# Patient Record
Sex: FEMALE | Race: OTHER | NOT HISPANIC OR LATINO
[De-identification: names, ages, dates, MRNs, and addresses within clinical notes are randomized per-mention and may not be internally consistent; named-entity substitution may affect disease eponyms.]

---

## 2022-01-07 PROBLEM — Z00.00 ENCOUNTER FOR PREVENTIVE HEALTH EXAMINATION: Status: ACTIVE | Noted: 2022-01-07

## 2022-01-28 ENCOUNTER — APPOINTMENT (OUTPATIENT)
Dept: PLASTIC SURGERY | Facility: CLINIC | Age: 32
End: 2022-01-28

## 2022-03-11 ENCOUNTER — APPOINTMENT (OUTPATIENT)
Dept: PLASTIC SURGERY | Facility: CLINIC | Age: 32
End: 2022-03-11

## 2022-05-23 ENCOUNTER — APPOINTMENT (OUTPATIENT)
Dept: PLASTIC SURGERY | Facility: CLINIC | Age: 32
End: 2022-05-23

## 2022-05-23 PROCEDURE — XXXXX: CPT | Mod: 1L

## 2022-05-24 ENCOUNTER — TRANSCRIPTION ENCOUNTER (OUTPATIENT)
Age: 32
End: 2022-05-24

## 2022-06-01 NOTE — HISTORY OF PRESENT ILLNESS
[Home] : at home, [unfilled] , at the time of the visit. [Medical Office: ([facility])___] : at the medical office located in  [Verbal consent obtained from patient] : the patient, [unfilled]

## 2022-06-01 NOTE — DISCUSSION/SUMMARY
[FreeTextEntry1] : This king patient began transitioning since 14 years old.\par She has been on hormonal therapy consistently since 18 years old.\par She has been in therapy and was diagnosed with Gender Dysphoria concerned about certain facial features that appear masculine and cause bullying desires revision facial feminization surgery followed by Transgender team.\par The following facial features appear masculine and will need to be modified:\par - Brow\par - Nose (revision)\par - Jawline\par - Neck with tracheal bulge\par - Lips\par - Cheeks\par \par Allergies: None\par \par Medications:\par Estradiol injection, spironolactone\par \par PMHx:\par No significant medical history.\par \par Social Hx: No marijuana use; no tobacco use.\par \par Past Surgical History:\par - Surgery Type: Facial feminization surgery - rhinoplasty\par Location: [place]\par Year: 2010\par Complications? None\par \par - Surgery Type: Breast augmentation\par Location: [place]\par Year: 2020\par Complications? None\par \par - Surgery Type: [facility] \par Location: [place]\par Year: 2020\par Complications? None\par \par Patient denies having silicone, fillers, or botox.\par ROS: General health / Constitutional no fever, no chills, no unusual weight changes, no energy level changes, no night sweats.\par Skin: No color or pigmentation changes, no pruritis, no rashes, no ulcers, \par Hair: No changes in color, texture, distribution, loss \par Nails: No color changes, brittleness, infection \par Head: No headaches, no new jaw pain \par Eyes: Good visual acuity, no color blindness, no corrective lenses, no photophobia, no diplopia, no blurred vision, no infection, pain, no medications, \par Ear: no tinnitus, no discharge, no pain, no medications \par Nose: no epistaxis, no rhinorrhea, no rhinitis, no pain, \par Mouth & Throat: no gingivitis, no gingival bleeding, no ulcers, no voice changes, no changes in oral mucosa or tongue \par Neck: no stiffness, no pain, no lymphadenitis, no thyroid disorders, \par Respiratory: no cough, no dyspnea, no wheezing, no chest pain, cyanosis, no pneumonia, no asthma, \par Cardiovascular: no chest pain, no palpitations, no irregular rhythm, no tachycardia, no bradycardia, no heart failure, no dyspnea on exertion (CHAVEZ), no edema \par Gastrointestinal: no nausea / vomiting, no dysphagia, no reflux / GERD, no abdominal pain, no jaundice \par Musculoskeletal: no pain in muscles, bones, or joints; no fractures, no dislocations, no muscular weakness, no atrophy \par Neurological: no paresis, no paralysis, no paresthesia, no seizures, no dizziness, no syncope, no ataxia, no tremor \par Psychological: no childhood behavioral problems, no irritability, no sleep changes \par Hematological: no anemia, no bruising, no bleeding tendencies, \par \par PHYSICAL EXAM \par General: WDWN, in no distress, A & O x 3 (person, place, time) \par HEENT \par Head: AT/NC (atraumatic, normocephalic), including TMJ, sensory and motor; + Prominent brow and lateral orbital rim type III.\par Eyes: EOMI, PERRLA \par Ears: exterior, nl hearing, \par Nose: + bulbous tip with poor tip support, wide nose,\par intranasal exam showed enlarged turbinates and deviated caudal septum.\par Throat & mouth: gd palate elevation, nl tongue mobility, nl tonsil size; + prominent mandibular angle. Active masseter; + wide chin; +Hypoplastic cheeks; hypoplastic lips.\par Neck: no masses, nl pulses, + Prominent tracheal bulge ("Sean's Apple"); +excess submental fat.\par \par We had a 25 minute meeting with the patient discussing diagnosis and medical management issues and outcomes. \par \par Despite having nose surgery during her initial facial feminization surgery, she continues to have a nose that is asymmetric, bulbous and wide with poor tip support which continues to create a masculine appearance. To alleviate these concerns and to create a feminine appearance that will help alleviate her feelings of dysphoria, we recommend a nose revision, which will promote a more feminine appearance. \par \par We also recommend these new facial procedures in regions that have not been operated on before (not revisions to old procedures):\par 1) tracheal shave, 2) submental fat excision; 3) neck tightening/platysmaplasty, 4) Mandibular angle reduction, 5) chin reduction, 6) Malar fat grafting, 7) Upper and lower lip augmentation, 8) Frontal sinus setback, 9) Supraorbital recontouring, 10) Browlift\par \par Plan: \par Revision facial feminization surgery.\par 21139: Frontal sinus anterior wall setback\par 87925: Orbital reconstruction bilateral\par 03817: Browlift bilateral and hairline lowering\par 30880: Supraorbital bar recontouring bilateral\par 65971: Tarsorrhaphy bilateral\par 21209, Mandibular angle resection bilateral\par 21127: Mandibular reconstruction with autologous tissue bilateral\par 21122: Osseous genioplasty narrowing, shortening\par 50753: Mandibular reconstruction with prosthetics bilateral\par 42185: Rhinoplasty (Revision)\par 89220: Submucous resection of septum (nose)\par 17108: Cartilage grafting for nasal reconstruction, use of cadaveric cartilage for  grafts, columellar strut, tip graft\par 24937: Tracheal shave, Laryngeothyroidoplasty\par 11285: Submental fat excision.\par 62156: platysmaplasty\par 01530: Fat grafting to malar regions bilateral from abdomen first 25 ccs\par 15770 x 2: Upper and lower lip augmentation with temporalis fascia\par \par      21139 (Frontal sinus setback): Previous exposure to testosterone has led this patient to have a male appearing forehead with a more bossed shaped; this is opposed to a female appearing forehead that is more flat. A frontal sinus setback procedure will reshape the anterior wall of the frontal sinus by pushing back the bone and change the contour from ‘convex’ (male-shape) to ‘flat’ (female-shape). This surgical change will create a more flattened feminine brow appearance.\par \par ·       88903 (Browlift): Previous exposure to testosterone has led to the patient having a male ‘M-shaped’ hairline as opposed to a female ‘upside-down U-shaped’ hairline. Her receded hairline also creates a high, broad male appearing forehead. Her low set eyebrows on top of her orbital roof rim give her a cano, male-appearing look. Both of these male traits: a high hairline and low-set brows are causing her intense feelings of dysphoria. She would benefit from bilateral browlift and hairline lowering procedures. Raising her lateral eyebrow with a bilateral browlift will create a more female appearance. She has stressed a strong desire to wear her own natural hair and does not want to always have to wear a wig to cover up her male features.\par \par ·       34933 (bilateral orbital reconstruction): The bone growth that occurred during testosterone exposure in the upper half of each orbital has caused this patient to have male appearing orbital features that contribute to the sense of dysphoria she feels in public. Orbital reconstruction with a reciprocating saw for an “L-shaped” ostectomy, on both sides will help remove the excessive bony protrusion; this will be followed by bone material grafting and resorbable plate fixation. The bilateral orbital reconstruction will help alleviate these orbital and upper facial male features.\par \par ·       13843 (Supraorbital bar contouring): This patient has orbital lateral hooding or overhang of her lateral frontal bone which is typically associated with the male skull and orbits. Supraorbital contouring of this lateral orbital region with a pineapple amanda will correct this male feature that is causing this patient dysphoria. These procedures also allows us to do a success browlift procedure since the overhang of bone can inhibit the upper movement of the brow and the removal of this allows for an effective lift.\par \par ·       33184 (Tarsorrhaphy): Bilateral tarsorrhaphy or surgical closure of both eyelids, after protective lacrilube or perilube ointment is applied, is necessary for the safety of the patient’s globes. With the brow reshaping and browlift procedure the upper eyelid will be pulled up so the globe will be exposed and unprotected during this long, proposed procedure. The tarsorrhaphies, allows both globes to be protected so the complications of corneal abrasions may be prevented.\par \par ·       83252 (mandibular angle resection/reduction): This patient has an angular, more boxy jaw which results in male appearing lower face. She also has increased lower facial width related to her mandibular angle lateral projection. Mandibular angle resection/reduction will create a more feminine triangular jaw. By having a mandibular angle reduction, the lower facial width is narrowed and this will create a “V-shaped”, feminine appearance of the jawline when viewed from the front.\par \par ·       62911 (Reconstruction of lower jaw with autologous tissue): This patient’s wide, “U-shaped” lower jaw accounts for public criticism related to male facial features. We propose reconstruction of the lower jaw with bone graft material layered to form a more feminine shape. The goal of this autologous tissue reconstructive procedure is to achieve a tapered, feminine lower jaw.\par \par ·       73701 (osseous genioplasty): This patient has a wide, large chin that contributes to her feelings of gender dysphoria and being mis-gendered in public.  The patient would benefit from an osseous genioplasty that narrows and shortens the chin. The osseous genioplasty will help create a more feminine and more, slender chin. \par \par ·       03017 (Reconstruction of lower jaw with prosthetic): This patient complained of a male shape to the lower one-third of her face. Reconstruction with a titanium implant used to create a chin point angle and support the bone in healing will help the patient achieve her goal of a more female lower face.\par \par ·       31049 (Revision Rhinoplasty): This patient’s nose has characteristics of a male nose. The male nose is often larger and wider with a more bulbous nasal tip. These male nasal features make her feel masculine which exacerbates her gender dysphoria. A rhinoplasty would be beneficial to feminize her nose by creating a smaller nose and more delicate, softer nasal tip. The lateral dorsal shape will also be feminized by the rhinoplasty.\par \par ·       21151 (Submucous resection of nose): This patient’s nasal septum is shaped like a male septum. The septum is the supportive pole that holds up the structure of the nasal pyramid. In this case the septum will also be used to provide cartilage tissue necessary for nasal grafts. This septoplasty is required to modify the septum to create a straight nose with good functional breathing while taking away the characteristics of a male nose.\par \par ·       11141 (Cartilage grafting for nasal reconstruction): Cartilage grafting is crucial for the performance of the feminizing rhinoplasty. This patient has an ethnic type of nose. With regards to her nose, she wants to keep true to her ethnicity while appearing more feminine. To do that cartilage grafts including, bilateral  grafts, a columellar strut graft, a dorsal onlay graft, and nasal tip graft are all necessary.  The dorsal onlay graft will raise the nasal radix and improve the frontonasal regional shape.\par \par ·       38911 (Submental fat removal): Testosterone exposure will build fibrofatty tissue in the submental region that is not corrected by hormone therapy. This patient has this fibrofatty tissue in the submental region which has created a masculine lateral profile appearance. Direct submental fibrofatty tissue excision is necessary to correct this male feature.\par \par ·       23970 (Platysmaplasty): With prolonged testosterone exposure, the submental region will appear full and ptotic. This patient has a full and ptotic submental and neck region which is associated with a male-appearing neck. The female neck is slender and tight. The platsymaplasty, performed after submental fat excision, will help create a slender and tight, female appearing neck.\par \par ·       15770  x 2 (Upper and lower lip augmentation): Females are shown to have gee lips than males, therefore, an upper and lower lip augmentation will create a more feminine appearance for this patient as she has currently has hypoplastic lips. Lip augmentation is also a procedure that not all patients need but we deem that this procedure is necessary for this patient as it well help alleviate her gender dysphoria. \par \par ·       45783 (Tracheal shave or tracheolaryngoplasty): A prominent “Sean’s Apple” is a feature associated with males. Not all trans-women have a prominent “Sean’s Apple”.. However, this trans-woman has a prominent ‘Sean’s Apple (also known as a large laryngeal prominence). This is seen on lateral head position and when her head is raised to the pamela. It causes her intense feelings of dysphoria and it is a cause for misgendering. Therefore, the patient would benefit from a tracheal shave procedure which eliminates this prominent “Sean’s Apple” associated with male appearance.\par \par ·       06199 (Fat grafting to malar facial regions): This patient had prolonged testosterone exposure resulting in male mid-face features with depressed soft tissues in the cheek region. More fullness in the cheek region may be created with fat grafting from the abdomen or hips to the cheek region creating a more full, feminine appearance. The Kaur fat grafting technique with atraumatic harvest and grafting leads to a 70%+ take of fat grafting to this region and is suggested. This procedure will correct the hypoplastic cheeks.\par \par Needs a 3D CT scan and Virtual Surgical Planning. Patient has both letters from her therapist and hormone provider. Will need PCP clearance.\par \par Patient seen in conjunction with Dr. Skinner.\par \par \par

## 2022-07-01 ENCOUNTER — RESULT REVIEW (OUTPATIENT)
Age: 32
End: 2022-07-01

## 2022-07-01 ENCOUNTER — OUTPATIENT (OUTPATIENT)
Dept: OUTPATIENT SERVICES | Facility: HOSPITAL | Age: 32
LOS: 1 days | End: 2022-07-01

## 2022-07-01 ENCOUNTER — APPOINTMENT (OUTPATIENT)
Dept: CT IMAGING | Facility: CLINIC | Age: 32
End: 2022-07-01

## 2022-07-01 PROCEDURE — 70486 CT MAXILLOFACIAL W/O DYE: CPT | Mod: 26

## 2022-08-29 ENCOUNTER — APPOINTMENT (OUTPATIENT)
Dept: PLASTIC SURGERY | Facility: CLINIC | Age: 32
End: 2022-08-29

## 2022-08-29 PROCEDURE — 99213 OFFICE O/P EST LOW 20 MIN: CPT

## 2022-08-29 PROCEDURE — 99072 ADDL SUPL MATRL&STAF TM PHE: CPT

## 2022-09-02 VITALS
OXYGEN SATURATION: 96 % | RESPIRATION RATE: 16 BRPM | DIASTOLIC BLOOD PRESSURE: 77 MMHG | WEIGHT: 188.05 LBS | SYSTOLIC BLOOD PRESSURE: 123 MMHG | HEIGHT: 72 IN | HEART RATE: 90 BPM | TEMPERATURE: 98 F

## 2022-09-02 RX ORDER — INFLUENZA VIRUS VACCINE 15; 15; 15; 15 UG/.5ML; UG/.5ML; UG/.5ML; UG/.5ML
0.5 SUSPENSION INTRAMUSCULAR ONCE
Refills: 0 | Status: DISCONTINUED | OUTPATIENT
Start: 2022-09-06 | End: 2022-09-09

## 2022-09-02 NOTE — PATIENT PROFILE ADULT - NSPROGENBLOODRESTRICT_GEN_A_NUR
blood borne infection concerns Patient does not want any blood transfusion. Patient was educated on the safety protocols taken for a safe transfusion in an emergency situation./blood borne infection concerns

## 2022-09-02 NOTE — PATIENT PROFILE ADULT - FALL HARM RISK - UNIVERSAL INTERVENTIONS
Bed in lowest position, wheels locked, appropriate side rails in place/Call bell, personal items and telephone in reach/Instruct patient to call for assistance before getting out of bed or chair/Non-slip footwear when patient is out of bed/[place] to call system/Physically safe environment - no spills, clutter or unnecessary equipment/Purposeful Proactive Rounding/Room/bathroom lighting operational, light cord in reach

## 2022-09-03 LAB — SARS-COV-2 N GENE NPH QL NAA+PROBE: NOT DETECTED

## 2022-09-05 ENCOUNTER — TRANSCRIPTION ENCOUNTER (OUTPATIENT)
Age: 32
End: 2022-09-05

## 2022-09-06 ENCOUNTER — APPOINTMENT (OUTPATIENT)
Dept: PLASTIC SURGERY | Facility: HOSPITAL | Age: 32
End: 2022-09-06

## 2022-09-06 ENCOUNTER — INPATIENT (INPATIENT)
Facility: HOSPITAL | Age: 32
LOS: 2 days | Discharge: HOME | DRG: 876 | End: 2022-09-09
Attending: SURGERY | Admitting: SURGERY
Payer: MEDICAID

## 2022-09-06 DIAGNOSIS — Z98.82 BREAST IMPLANT STATUS: Chronic | ICD-10-CM

## 2022-09-06 LAB
ANION GAP SERPL CALC-SCNC: 11 MMOL/L — SIGNIFICANT CHANGE UP (ref 5–17)
ANION GAP SERPL CALC-SCNC: 8 MMOL/L — SIGNIFICANT CHANGE UP (ref 5–17)
APTT BLD: 20.9 SEC — LOW (ref 27.5–35.5)
BASE EXCESS BLDA CALC-SCNC: -1.5 MMOL/L — SIGNIFICANT CHANGE UP (ref -2–3)
BASE EXCESS BLDA CALC-SCNC: 0 MMOL/L — SIGNIFICANT CHANGE UP (ref -2–3)
BUN SERPL-MCNC: 13 MG/DL — SIGNIFICANT CHANGE UP (ref 7–23)
BUN SERPL-MCNC: 9 MG/DL — SIGNIFICANT CHANGE UP (ref 7–23)
CALCIUM SERPL-MCNC: 4.8 MG/DL — CRITICAL LOW (ref 8.4–10.5)
CALCIUM SERPL-MCNC: 8 MG/DL — LOW (ref 8.4–10.5)
CHLORIDE SERPL-SCNC: 101 MMOL/L — SIGNIFICANT CHANGE UP (ref 96–108)
CHLORIDE SERPL-SCNC: 118 MMOL/L — HIGH (ref 96–108)
CO2 BLDA-SCNC: 26 MMOL/L — HIGH (ref 19–24)
CO2 BLDA-SCNC: 27 MMOL/L — HIGH (ref 19–24)
CO2 SERPL-SCNC: 17 MMOL/L — LOW (ref 22–31)
CO2 SERPL-SCNC: 24 MMOL/L — SIGNIFICANT CHANGE UP (ref 22–31)
CREAT SERPL-MCNC: 0.38 MG/DL — LOW (ref 0.5–1.3)
CREAT SERPL-MCNC: 0.63 MG/DL — SIGNIFICANT CHANGE UP (ref 0.5–1.3)
EGFR: 119 ML/MIN/1.73M2 — SIGNIFICANT CHANGE UP
EGFR: 135 ML/MIN/1.73M2 — SIGNIFICANT CHANGE UP
GLUCOSE SERPL-MCNC: 118 MG/DL — HIGH (ref 70–99)
GLUCOSE SERPL-MCNC: 168 MG/DL — HIGH (ref 70–99)
HCO3 BLDA-SCNC: 25 MMOL/L — SIGNIFICANT CHANGE UP (ref 21–28)
HCO3 BLDA-SCNC: 25 MMOL/L — SIGNIFICANT CHANGE UP (ref 21–28)
HCT VFR BLD CALC: 37.8 % — SIGNIFICANT CHANGE UP (ref 34.5–45)
HGB BLD-MCNC: 12.6 G/DL — SIGNIFICANT CHANGE UP (ref 11.5–15.5)
INR BLD: 1.09 — SIGNIFICANT CHANGE UP (ref 0.88–1.16)
MAGNESIUM SERPL-MCNC: 1.5 MG/DL — LOW (ref 1.6–2.6)
MCHC RBC-ENTMCNC: 30.8 PG — SIGNIFICANT CHANGE UP (ref 27–34)
MCHC RBC-ENTMCNC: 33.3 GM/DL — SIGNIFICANT CHANGE UP (ref 32–36)
MCV RBC AUTO: 92.4 FL — SIGNIFICANT CHANGE UP (ref 80–100)
NRBC # BLD: 0 /100 WBCS — SIGNIFICANT CHANGE UP (ref 0–0)
PCO2 BLDA: 43 MMHG — HIGH (ref 32–35)
PCO2 BLDA: 47 MMHG — HIGH (ref 32–35)
PH BLDA: 7.33 — LOW (ref 7.35–7.45)
PH BLDA: 7.38 — SIGNIFICANT CHANGE UP (ref 7.35–7.45)
PHOSPHATE SERPL-MCNC: 4.2 MG/DL — SIGNIFICANT CHANGE UP (ref 2.5–4.5)
PLATELET # BLD AUTO: 341 K/UL — SIGNIFICANT CHANGE UP (ref 150–400)
PO2 BLDA: 133 MMHG — HIGH (ref 83–108)
PO2 BLDA: 68 MMHG — LOW (ref 83–108)
POTASSIUM SERPL-MCNC: 2.6 MMOL/L — CRITICAL LOW (ref 3.5–5.3)
POTASSIUM SERPL-MCNC: 4.3 MMOL/L — SIGNIFICANT CHANGE UP (ref 3.5–5.3)
POTASSIUM SERPL-SCNC: 2.6 MMOL/L — CRITICAL LOW (ref 3.5–5.3)
POTASSIUM SERPL-SCNC: 4.3 MMOL/L — SIGNIFICANT CHANGE UP (ref 3.5–5.3)
PROTHROM AB SERPL-ACNC: 13 SEC — SIGNIFICANT CHANGE UP (ref 10.5–13.4)
RBC # BLD: 4.09 M/UL — SIGNIFICANT CHANGE UP (ref 3.8–5.2)
RBC # FLD: 13.5 % — SIGNIFICANT CHANGE UP (ref 10.3–14.5)
SAO2 % BLDA: 93.8 % — LOW (ref 94–98)
SAO2 % BLDA: 98.9 % — HIGH (ref 94–98)
SODIUM SERPL-SCNC: 136 MMOL/L — SIGNIFICANT CHANGE UP (ref 135–145)
SODIUM SERPL-SCNC: 143 MMOL/L — SIGNIFICANT CHANGE UP (ref 135–145)
WBC # BLD: 19.38 K/UL — HIGH (ref 3.8–10.5)
WBC # FLD AUTO: 19.38 K/UL — HIGH (ref 3.8–10.5)

## 2022-09-06 PROCEDURE — 15773 GRFG AUTOL FAT LIPO 25 CC/<: CPT

## 2022-09-06 PROCEDURE — 15770 DERMA-FAT-FASCIA GRAFT: CPT

## 2022-09-06 PROCEDURE — 30450 REVISION OF NOSE: CPT

## 2022-09-06 PROCEDURE — 99291 CRITICAL CARE FIRST HOUR: CPT | Mod: GC

## 2022-09-06 PROCEDURE — 15825 RHYTDCT NCK PLTYSML TGHTG: CPT

## 2022-09-06 PROCEDURE — 71045 X-RAY EXAM CHEST 1 VIEW: CPT | Mod: 26

## 2022-09-06 PROCEDURE — 71045 X-RAY EXAM CHEST 1 VIEW: CPT | Mod: 26,77,76

## 2022-09-06 PROCEDURE — 21256 RECONSTRUCTION OF ORBIT: CPT

## 2022-09-06 PROCEDURE — 15824 RHYTIDECTOMY FOREHEAD: CPT

## 2022-09-06 PROCEDURE — 67875 CLOSURE OF EYELID BY SUTURE: CPT

## 2022-09-06 PROCEDURE — 21172 RCNST SUPR-LAT ORB RM&LW FHD: CPT

## 2022-09-06 PROCEDURE — 21125 AUGMENTATION MNDBLR PROSTC: CPT

## 2022-09-06 PROCEDURE — 21122 GENIOP SLDG OSTEOT 2/>: CPT

## 2022-09-06 PROCEDURE — 21127 AUGMENTATION MNDBLR B1 GRF: CPT

## 2022-09-06 PROCEDURE — 21139 RDCTJ FOREHEAD CNTRG&SETBACK: CPT

## 2022-09-06 PROCEDURE — 15839 EXC EXCESSIVE SKN OTHER AREA: CPT

## 2022-09-06 PROCEDURE — 21209 REDUCTION OF FACIAL BONES: CPT

## 2022-09-06 PROCEDURE — 30520 REPAIR OF NASAL SEPTUM: CPT

## 2022-09-06 PROCEDURE — 20912 REMOVE CARTILAGE FOR GRAFT: CPT | Mod: 59

## 2022-09-06 PROCEDURE — 31899 UNLISTED PX TRACHEA BRONCHI: CPT

## 2022-09-06 DEVICE — GUIDE CUTTING W/PLAN TRANSFORM: Type: IMPLANTABLE DEVICE | Status: FUNCTIONAL

## 2022-09-06 DEVICE — MESH RESORB RXG 51X51MM 0.6MM: Type: IMPLANTABLE DEVICE | Status: FUNCTIONAL

## 2022-09-06 DEVICE — IMP CUSTOM IPS TI 67MM: Type: IMPLANTABLE DEVICE | Status: FUNCTIONAL

## 2022-09-06 DEVICE — SCREW BONE RESORB 2.1X4MM SONIC PIN: Type: IMPLANTABLE DEVICE | Status: FUNCTIONAL

## 2022-09-06 DEVICE — SCREW EMERG CROSS DRIVE TI 2X9: Type: IMPLANTABLE DEVICE | Status: FUNCTIONAL

## 2022-09-06 DEVICE — GUIDE MARKING CRANIAL: Type: IMPLANTABLE DEVICE | Status: FUNCTIONAL

## 2022-09-06 DEVICE — PIN SONIC RX 2.1X4MM: Type: IMPLANTABLE DEVICE | Status: FUNCTIONAL

## 2022-09-06 DEVICE — SCREW MAXDRIVE MINI 2X7MM: Type: IMPLANTABLE DEVICE | Status: FUNCTIONAL

## 2022-09-06 DEVICE — GUIDE CUTTING TRANSFORM XS: Type: IMPLANTABLE DEVICE | Status: FUNCTIONAL

## 2022-09-06 DEVICE — LUKENS BONE WAX 2.5G: Type: IMPLANTABLE DEVICE | Status: FUNCTIONAL

## 2022-09-06 DEVICE — GUIDE MARKING TRANSFORM XS: Type: IMPLANTABLE DEVICE | Status: FUNCTIONAL

## 2022-09-06 RX ORDER — CEFAZOLIN SODIUM 1 G
2000 VIAL (EA) INJECTION EVERY 8 HOURS
Refills: 0 | Status: DISCONTINUED | OUTPATIENT
Start: 2022-09-06 | End: 2022-09-06

## 2022-09-06 RX ORDER — DEXAMETHASONE 0.5 MG/5ML
10 ELIXIR ORAL EVERY 12 HOURS
Refills: 0 | Status: DISCONTINUED | OUTPATIENT
Start: 2022-09-06 | End: 2022-09-08

## 2022-09-06 RX ORDER — FENTANYL CITRATE 50 UG/ML
0.5 INJECTION INTRAVENOUS
Qty: 2500 | Refills: 0 | Status: DISCONTINUED | OUTPATIENT
Start: 2022-09-06 | End: 2022-09-08

## 2022-09-06 RX ORDER — ACETAMINOPHEN 500 MG
650 TABLET ORAL EVERY 6 HOURS
Refills: 0 | Status: DISCONTINUED | OUTPATIENT
Start: 2022-09-06 | End: 2022-09-07

## 2022-09-06 RX ORDER — CEFTRIAXONE 500 MG/1
2000 INJECTION, POWDER, FOR SOLUTION INTRAMUSCULAR; INTRAVENOUS EVERY 24 HOURS
Refills: 0 | Status: DISCONTINUED | OUTPATIENT
Start: 2022-09-06 | End: 2022-09-07

## 2022-09-06 RX ORDER — OXYCODONE HYDROCHLORIDE 5 MG/1
10 TABLET ORAL EVERY 6 HOURS
Refills: 0 | Status: DISCONTINUED | OUTPATIENT
Start: 2022-09-06 | End: 2022-09-06

## 2022-09-06 RX ORDER — CHLORHEXIDINE GLUCONATE 213 G/1000ML
15 SOLUTION TOPICAL
Refills: 0 | Status: DISCONTINUED | OUTPATIENT
Start: 2022-09-06 | End: 2022-09-09

## 2022-09-06 RX ORDER — FENTANYL CITRATE 50 UG/ML
50 INJECTION INTRAVENOUS ONCE
Refills: 0 | Status: DISCONTINUED | OUTPATIENT
Start: 2022-09-06 | End: 2022-09-06

## 2022-09-06 RX ORDER — OXYCODONE HYDROCHLORIDE 5 MG/1
5 TABLET ORAL EVERY 4 HOURS
Refills: 0 | Status: DISCONTINUED | OUTPATIENT
Start: 2022-09-06 | End: 2022-09-06

## 2022-09-06 RX ORDER — CEFAZOLIN SODIUM 1 G
2000 VIAL (EA) INJECTION EVERY 8 HOURS
Refills: 0 | Status: DISCONTINUED | OUTPATIENT
Start: 2022-09-06 | End: 2022-09-07

## 2022-09-06 RX ORDER — APREPITANT 80 MG/1
40 CAPSULE ORAL ONCE
Refills: 0 | Status: DISCONTINUED | OUTPATIENT
Start: 2022-09-06 | End: 2022-09-06

## 2022-09-06 RX ORDER — PROPOFOL 10 MG/ML
5 INJECTION, EMULSION INTRAVENOUS
Qty: 1000 | Refills: 0 | Status: DISCONTINUED | OUTPATIENT
Start: 2022-09-06 | End: 2022-09-08

## 2022-09-06 RX ORDER — ONDANSETRON 8 MG/1
4 TABLET, FILM COATED ORAL EVERY 6 HOURS
Refills: 0 | Status: DISCONTINUED | OUTPATIENT
Start: 2022-09-06 | End: 2022-09-09

## 2022-09-06 RX ORDER — HYDROMORPHONE HYDROCHLORIDE 2 MG/ML
0.5 INJECTION INTRAMUSCULAR; INTRAVENOUS; SUBCUTANEOUS
Refills: 0 | Status: DISCONTINUED | OUTPATIENT
Start: 2022-09-06 | End: 2022-09-06

## 2022-09-06 RX ORDER — FENTANYL CITRATE 50 UG/ML
0.5 INJECTION INTRAVENOUS
Qty: 2500 | Refills: 0 | Status: DISCONTINUED | OUTPATIENT
Start: 2022-09-06 | End: 2022-09-06

## 2022-09-06 RX ORDER — NOREPINEPHRINE BITARTRATE/D5W 8 MG/250ML
0.05 PLASTIC BAG, INJECTION (ML) INTRAVENOUS
Qty: 8 | Refills: 0 | Status: DISCONTINUED | OUTPATIENT
Start: 2022-09-06 | End: 2022-09-07

## 2022-09-06 RX ORDER — PROPOFOL 10 MG/ML
0.1 INJECTION, EMULSION INTRAVENOUS
Qty: 1000 | Refills: 0 | Status: DISCONTINUED | OUTPATIENT
Start: 2022-09-06 | End: 2022-09-06

## 2022-09-06 RX ORDER — MAGNESIUM SULFATE 500 MG/ML
2 VIAL (ML) INJECTION
Refills: 0 | Status: COMPLETED | OUTPATIENT
Start: 2022-09-06 | End: 2022-09-07

## 2022-09-06 RX ORDER — SPIRONOLACTONE 25 MG/1
0 TABLET, FILM COATED ORAL
Qty: 0 | Refills: 0 | DISCHARGE

## 2022-09-06 RX ORDER — SODIUM CHLORIDE 9 MG/ML
1000 INJECTION, SOLUTION INTRAVENOUS
Refills: 0 | Status: DISCONTINUED | OUTPATIENT
Start: 2022-09-06 | End: 2022-09-07

## 2022-09-06 RX ORDER — CHLORHEXIDINE GLUCONATE 213 G/1000ML
1 SOLUTION TOPICAL
Refills: 0 | Status: DISCONTINUED | OUTPATIENT
Start: 2022-09-06 | End: 2022-09-09

## 2022-09-06 RX ADMIN — SODIUM CHLORIDE 80 MILLILITER(S): 9 INJECTION, SOLUTION INTRAVENOUS at 23:26

## 2022-09-06 RX ADMIN — Medication 25 GRAM(S): at 23:06

## 2022-09-06 RX ADMIN — Medication 102 MILLIGRAM(S): at 19:59

## 2022-09-06 RX ADMIN — PROPOFOL 2.56 MICROGRAM(S)/KG/MIN: 10 INJECTION, EMULSION INTRAVENOUS at 21:21

## 2022-09-06 RX ADMIN — Medication 8 MICROGRAM(S)/KG/MIN: at 21:00

## 2022-09-06 RX ADMIN — FENTANYL CITRATE 50 MICROGRAM(S): 50 INJECTION INTRAVENOUS at 20:40

## 2022-09-06 RX ADMIN — FENTANYL CITRATE 4.27 MICROGRAM(S)/KG/HR: 50 INJECTION INTRAVENOUS at 21:21

## 2022-09-06 RX ADMIN — FENTANYL CITRATE 50 MICROGRAM(S): 50 INJECTION INTRAVENOUS at 20:15

## 2022-09-06 RX ADMIN — FENTANYL CITRATE 50 MICROGRAM(S): 50 INJECTION INTRAVENOUS at 21:10

## 2022-09-06 RX ADMIN — CEFTRIAXONE 100 MILLIGRAM(S): 500 INJECTION, POWDER, FOR SOLUTION INTRAMUSCULAR; INTRAVENOUS at 21:22

## 2022-09-06 NOTE — BRIEF OPERATIVE NOTE - NSICDXBRIEFPROCEDURE_GEN_ALL_CORE_FT
PROCEDURES:  Contouring, forehead, anterior frontal sinus wall, for reduction and setback 06-Sep-2022 17:06:40  Yissel Fields  Genioplasty 06-Sep-2022 17:07:19  Yissel Fields  Complete rhinoplasty 06-Sep-2022 17:07:27  Yissel Fields  Platysmaplasty 06-Sep-2022 17:07:40  Yissel Fields  Excision, submental fat pad excessive skin and subcutaneous tissue 06-Sep-2022 17:07:48  Yissel Fields  Fat grafting 06-Sep-2022 17:09:35  Yissel Fields

## 2022-09-06 NOTE — BRIEF OPERATIVE NOTE - OPERATION/FINDINGS
Liposuction to abdomen with fat grafting to face, submental fat excision and scar revision. Frontal sinus set back and supraorbital rim contour, genioplasty, bilateral mandible angle reduction, submental fat excision and platysmaplasty

## 2022-09-06 NOTE — CONSULT NOTE ADULT - NS ATTEND AMEND GEN_ALL_CORE FT
S/P feminization surgery after extubation had some nasal bleeding with suctioning required followed by hypoxemia and tachycardia. Now improving and down from 100% to 60% oxygen  physical as above except left mid lung rhonchi with focal B lines on both sides and some air bronchograms  discussed with anesthesia and plastics; possible components of blood aspiration and negative pressure pulmonary edema. Doubt PE with her improving so fast and the history favors the above impression  will give HFNC and titrate down from 60% as tolerated; pulse down to 108 from 140  If has fever will give ceftriaxone  admit to SICU for monitoring

## 2022-09-06 NOTE — CHART NOTE - NSCHARTNOTEFT_GEN_A_CORE
Patient became more hypoxemic and we could not make her comfortable with HFNC or mask as her nose felt too much pressure and she kept taking off the oxygen adjuncts.  /70, RR 24.  Discussed with patient and Dr. Carcamo of anesthesia and we agreed that safest course would be reintubation which was accomplished with some decrease in BP requiring NE. At this point to admit to SICU; PEEP currently 11 on 100% with 96% saturation. Will add ceftriaxone empirically.  Critical care time rendered 70 minutes Will address in separate encounter.

## 2022-09-06 NOTE — CONSULT NOTE ADULT - ASSESSMENT
32F undergoing male to female transformation, hx facial feminization, b/l breast implants, admitted for elective facial feminization surgery, extubated post-operatively, but had copious bloody secretions from nose and mouth, then became hypoxic while she was supine, suspect aspiration pneumonitis. transferred to SICU for hypoxic respiratory failure likely secondary to aspiration pneumonitis.     NEURO: tylenol PRN pain.   CV: hemodynamically stable.   PULM: high flow via face mask at 60 Liters and 37%, (pt declines nasal canula), POCUS with few B lines, but not significant, b/l consolidation (L>R),   GI/FEN: keep NPO for now given tenuous respiratory status, LR@80  PLASTICS: cont usual regimen of decadron 10q12h until dc home (usually till POD#2) per Plastics   : TOV  ENDO: ISS  ID: andrew-op ppx: ancef x 3 days (9/6--)   PPX: SCDs, NO SQH.    LINES: PIVs, trisha (9/6--)   WOUNDS/DRAINS: intranasal and intraoral wounds. drain under scalp.

## 2022-09-06 NOTE — CONSULT NOTE ADULT - SUBJECTIVE AND OBJECTIVE BOX
HPI: 32yFemale     PMH:     MEDS:  Allergies    No Known Allergies    Intolerances        ICU Vital Signs Last 24 Hrs  T(F): 97.3 (09-06-22 @ 16:33), Max: 97.3 (09-06-22 @ 16:33)  HR: 119 (09-06-22 @ 17:29) (96 - 131)  BP: 155/82 (09-06-22 @ 17:29) (113/68 - 157/76)  BP(mean): 100 (09-06-22 @ 17:29) (82 - 136)  ABP: 128/77 (09-06-22 @ 17:29)  RR: 34 (09-06-22 @ 17:29) (14 - 34)  SpO2: 91% (09-06-22 @ 17:29) (85% - 98%)    PHYSICAL EXAM:   Neurological: AAOx3, CNII-XII intact,  strength 5/5 b/l  ENT: mucus membrane moist  Cardiovascular: RRR  Respiratory: CTA  Gastrointestinal: soft, NT, ND, BS+  Extremities: warm, no dependent edema  Vascular: no cyanosis/erythema  Skin: no rashes  MSK: no joint swelling.   LABS:                              12.6   19.38 )-----------( 341      ( 06 Sep 2022 17:52 )             37.8     ABG - ( 06 Sep 2022 17:31 )  pH, Arterial: 7.33  pH, Blood: x     /  pCO2: 47    /  pO2: 133   / HCO3: 25    / Base Excess: -1.5  /  SaO2: 98.9            CAPILLARY BLOOD GLUCOSE        Lindsey:	  [ ] None	[ ] Daily Lindsey Order Placed	   Indication:	  [ ] Strict I and O's    [ ] Obstruction     [ ] Incontinence + Stage 3 or 4 Decubitus  Central Line:  [ ] None	   [ ]  Medication / TPN Administration     [ ] No Peripheral IV          HPI: 32yFemale (undergoing male to female transformation), hx facial feminization surgery, b/l breast implants, admitted for elective facial feminization, extubated post-operatively, but had copious bloody secretions from nose and mouth, then became hypoxic while she was supine, suspect aspiration.     SURGERY:  9/6: (facial feminization) Liposuction to abdomen with fat grafting to face, submental fat excision and scar revision. Frontal sinus set back and supraorbital rim contour, genioplasty, bilateral mandible angle reduction, submental fat excision and platysmaplasty    MEDS:  NKDA      ICU Vital Signs Last 24 Hrs  T(F): 97.3 (09-06-22 @ 16:33), Max: 97.3 (09-06-22 @ 16:33)  HR: 119 (09-06-22 @ 17:29) (96 - 131)  BP: 155/82 (09-06-22 @ 17:29) (113/68 - 157/76)  BP(mean): 100 (09-06-22 @ 17:29) (82 - 136)  ABP: 128/77 (09-06-22 @ 17:29)  RR: 34 (09-06-22 @ 17:29) (14 - 34)  SpO2: 91% (09-06-22 @ 17:29) (85% - 98%)    PHYSICAL EXAM:   Neurological: AAOx3, CNII-XII intact,  strength 5/5 b/l  ENT: incisions intranasally and intraorally, dried blood to nose and mouth, but no active bleeding.   Cardiovascular: RRR  Respiratory: few expiratory wheezes,   Gastrointestinal: soft, NT, ND, BS+  Extremities: warm, no dependent edema  Vascular: no cyanosis/erythema  Skin: no rashes  MSK: no joint swelling.     LABS:                      12.6   19.38 )-----------( 341      ( 06 Sep 2022 17:52 )             37.8     ABG - ( 06 Sep 2022 17:31 )  pH, Arterial: 7.33  pH, Blood: x     /  pCO2: 47    /  pO2: 133   / HCO3: 25    / Base Excess: -1.5  /  SaO2: 98.9            CAPILLARY BLOOD GLUCOSE        Lindsey:	  [x ] None	[ ] Daily Lindsey Order Placed	   Indication:	  [ ] Strict I and O's    [ ] Obstruction     [ ] Incontinence + Stage 3 or 4 Decubitus  Central Line:  [x ] None	   [ ]  Medication / TPN Administration     [ ] No Peripheral IV

## 2022-09-07 LAB
ALBUMIN SERPL ELPH-MCNC: 3.2 G/DL — LOW (ref 3.3–5)
ANION GAP SERPL CALC-SCNC: 10 MMOL/L — SIGNIFICANT CHANGE UP (ref 5–17)
ANION GAP SERPL CALC-SCNC: 8 MMOL/L — SIGNIFICANT CHANGE UP (ref 5–17)
BUN SERPL-MCNC: 11 MG/DL — SIGNIFICANT CHANGE UP (ref 7–23)
BUN SERPL-MCNC: 8 MG/DL — SIGNIFICANT CHANGE UP (ref 7–23)
CALCIUM SERPL-MCNC: 7.6 MG/DL — LOW (ref 8.4–10.5)
CALCIUM SERPL-MCNC: 7.6 MG/DL — LOW (ref 8.4–10.5)
CHLORIDE SERPL-SCNC: 101 MMOL/L — SIGNIFICANT CHANGE UP (ref 96–108)
CHLORIDE SERPL-SCNC: 102 MMOL/L — SIGNIFICANT CHANGE UP (ref 96–108)
CO2 SERPL-SCNC: 26 MMOL/L — SIGNIFICANT CHANGE UP (ref 22–31)
CO2 SERPL-SCNC: 28 MMOL/L — SIGNIFICANT CHANGE UP (ref 22–31)
CREAT SERPL-MCNC: 0.63 MG/DL — SIGNIFICANT CHANGE UP (ref 0.5–1.3)
CREAT SERPL-MCNC: 0.64 MG/DL — SIGNIFICANT CHANGE UP (ref 0.5–1.3)
EGFR: 119 ML/MIN/1.73M2 — SIGNIFICANT CHANGE UP
EGFR: 119 ML/MIN/1.73M2 — SIGNIFICANT CHANGE UP
GLUCOSE BLDC GLUCOMTR-MCNC: 135 MG/DL — HIGH (ref 70–99)
GLUCOSE SERPL-MCNC: 139 MG/DL — HIGH (ref 70–99)
GLUCOSE SERPL-MCNC: 144 MG/DL — HIGH (ref 70–99)
GRAM STN FLD: SIGNIFICANT CHANGE UP
HCT VFR BLD CALC: 31.4 % — LOW (ref 34.5–45)
HCT VFR BLD CALC: 33.5 % — LOW (ref 34.5–45)
HGB BLD-MCNC: 10.6 G/DL — LOW (ref 11.5–15.5)
HGB BLD-MCNC: 11.1 G/DL — LOW (ref 11.5–15.5)
MAGNESIUM SERPL-MCNC: 2.2 MG/DL — SIGNIFICANT CHANGE UP (ref 1.6–2.6)
MAGNESIUM SERPL-MCNC: 3.2 MG/DL — HIGH (ref 1.6–2.6)
MCHC RBC-ENTMCNC: 30.6 PG — SIGNIFICANT CHANGE UP (ref 27–34)
MCHC RBC-ENTMCNC: 31.2 PG — SIGNIFICANT CHANGE UP (ref 27–34)
MCHC RBC-ENTMCNC: 33.1 GM/DL — SIGNIFICANT CHANGE UP (ref 32–36)
MCHC RBC-ENTMCNC: 33.8 GM/DL — SIGNIFICANT CHANGE UP (ref 32–36)
MCV RBC AUTO: 92.3 FL — SIGNIFICANT CHANGE UP (ref 80–100)
MCV RBC AUTO: 92.4 FL — SIGNIFICANT CHANGE UP (ref 80–100)
NRBC # BLD: 0 /100 WBCS — SIGNIFICANT CHANGE UP (ref 0–0)
NRBC # BLD: 0 /100 WBCS — SIGNIFICANT CHANGE UP (ref 0–0)
PHOSPHATE SERPL-MCNC: 3.1 MG/DL — SIGNIFICANT CHANGE UP (ref 2.5–4.5)
PHOSPHATE SERPL-MCNC: 3.2 MG/DL — SIGNIFICANT CHANGE UP (ref 2.5–4.5)
PLATELET # BLD AUTO: 277 K/UL — SIGNIFICANT CHANGE UP (ref 150–400)
PLATELET # BLD AUTO: 293 K/UL — SIGNIFICANT CHANGE UP (ref 150–400)
POTASSIUM SERPL-MCNC: 4.2 MMOL/L — SIGNIFICANT CHANGE UP (ref 3.5–5.3)
POTASSIUM SERPL-MCNC: 4.2 MMOL/L — SIGNIFICANT CHANGE UP (ref 3.5–5.3)
POTASSIUM SERPL-SCNC: 4.2 MMOL/L — SIGNIFICANT CHANGE UP (ref 3.5–5.3)
POTASSIUM SERPL-SCNC: 4.2 MMOL/L — SIGNIFICANT CHANGE UP (ref 3.5–5.3)
RBC # BLD: 3.4 M/UL — LOW (ref 3.8–5.2)
RBC # BLD: 3.63 M/UL — LOW (ref 3.8–5.2)
RBC # FLD: 13.6 % — SIGNIFICANT CHANGE UP (ref 10.3–14.5)
RBC # FLD: 13.6 % — SIGNIFICANT CHANGE UP (ref 10.3–14.5)
SODIUM SERPL-SCNC: 137 MMOL/L — SIGNIFICANT CHANGE UP (ref 135–145)
SODIUM SERPL-SCNC: 138 MMOL/L — SIGNIFICANT CHANGE UP (ref 135–145)
SPECIMEN SOURCE: SIGNIFICANT CHANGE UP
WBC # BLD: 18.35 K/UL — HIGH (ref 3.8–10.5)
WBC # BLD: 18.68 K/UL — HIGH (ref 3.8–10.5)
WBC # FLD AUTO: 18.35 K/UL — HIGH (ref 3.8–10.5)
WBC # FLD AUTO: 18.68 K/UL — HIGH (ref 3.8–10.5)

## 2022-09-07 PROCEDURE — 71045 X-RAY EXAM CHEST 1 VIEW: CPT | Mod: 26

## 2022-09-07 PROCEDURE — 71045 X-RAY EXAM CHEST 1 VIEW: CPT | Mod: 26,77

## 2022-09-07 PROCEDURE — 99291 CRITICAL CARE FIRST HOUR: CPT | Mod: GC

## 2022-09-07 RX ORDER — ACETAMINOPHEN 500 MG
1000 TABLET ORAL ONCE
Refills: 0 | Status: COMPLETED | OUTPATIENT
Start: 2022-09-07 | End: 2022-09-07

## 2022-09-07 RX ORDER — MIDAZOLAM HYDROCHLORIDE 1 MG/ML
4 INJECTION, SOLUTION INTRAMUSCULAR; INTRAVENOUS ONCE
Refills: 0 | Status: DISCONTINUED | OUTPATIENT
Start: 2022-09-07 | End: 2022-09-07

## 2022-09-07 RX ORDER — CEFAZOLIN SODIUM 1 G
2000 VIAL (EA) INJECTION EVERY 8 HOURS
Refills: 0 | Status: DISCONTINUED | OUTPATIENT
Start: 2022-09-08 | End: 2022-09-09

## 2022-09-07 RX ORDER — DEXMEDETOMIDINE HYDROCHLORIDE IN 0.9% SODIUM CHLORIDE 4 UG/ML
0.5 INJECTION INTRAVENOUS
Qty: 400 | Refills: 0 | Status: DISCONTINUED | OUTPATIENT
Start: 2022-09-07 | End: 2022-09-08

## 2022-09-07 RX ORDER — CALCIUM GLUCONATE 100 MG/ML
2 VIAL (ML) INTRAVENOUS ONCE
Refills: 0 | Status: DISCONTINUED | OUTPATIENT
Start: 2022-09-07 | End: 2022-09-07

## 2022-09-07 RX ORDER — PANTOPRAZOLE SODIUM 20 MG/1
40 TABLET, DELAYED RELEASE ORAL DAILY
Refills: 0 | Status: DISCONTINUED | OUTPATIENT
Start: 2022-09-07 | End: 2022-09-09

## 2022-09-07 RX ORDER — HEPARIN SODIUM 5000 [USP'U]/ML
5000 INJECTION INTRAVENOUS; SUBCUTANEOUS EVERY 8 HOURS
Refills: 0 | Status: DISCONTINUED | OUTPATIENT
Start: 2022-09-07 | End: 2022-09-09

## 2022-09-07 RX ORDER — ACETAMINOPHEN 500 MG
1000 TABLET ORAL ONCE
Refills: 0 | Status: COMPLETED | OUTPATIENT
Start: 2022-09-07 | End: 2022-09-08

## 2022-09-07 RX ORDER — NOREPINEPHRINE BITARTRATE/D5W 8 MG/250ML
0.05 PLASTIC BAG, INJECTION (ML) INTRAVENOUS
Qty: 8 | Refills: 0 | Status: DISCONTINUED | OUTPATIENT
Start: 2022-09-07 | End: 2022-09-08

## 2022-09-07 RX ADMIN — DEXMEDETOMIDINE HYDROCHLORIDE IN 0.9% SODIUM CHLORIDE 11.5 MICROGRAM(S)/KG/HR: 4 INJECTION INTRAVENOUS at 14:02

## 2022-09-07 RX ADMIN — PROPOFOL 2.56 MICROGRAM(S)/KG/MIN: 10 INJECTION, EMULSION INTRAVENOUS at 10:27

## 2022-09-07 RX ADMIN — Medication 25 GRAM(S): at 02:04

## 2022-09-07 RX ADMIN — CHLORHEXIDINE GLUCONATE 15 MILLILITER(S): 213 SOLUTION TOPICAL at 05:21

## 2022-09-07 RX ADMIN — Medication 100 MILLIGRAM(S): at 17:17

## 2022-09-07 RX ADMIN — PROPOFOL 2.56 MICROGRAM(S)/KG/MIN: 10 INJECTION, EMULSION INTRAVENOUS at 23:03

## 2022-09-07 RX ADMIN — PROPOFOL 2.56 MICROGRAM(S)/KG/MIN: 10 INJECTION, EMULSION INTRAVENOUS at 02:41

## 2022-09-07 RX ADMIN — PROPOFOL 2.56 MICROGRAM(S)/KG/MIN: 10 INJECTION, EMULSION INTRAVENOUS at 01:15

## 2022-09-07 RX ADMIN — Medication 1000 MILLIGRAM(S): at 19:44

## 2022-09-07 RX ADMIN — MIDAZOLAM HYDROCHLORIDE 4 MILLIGRAM(S): 1 INJECTION, SOLUTION INTRAMUSCULAR; INTRAVENOUS at 09:22

## 2022-09-07 RX ADMIN — DEXMEDETOMIDINE HYDROCHLORIDE IN 0.9% SODIUM CHLORIDE 11.5 MICROGRAM(S)/KG/HR: 4 INJECTION INTRAVENOUS at 10:20

## 2022-09-07 RX ADMIN — Medication 102 MILLIGRAM(S): at 07:21

## 2022-09-07 RX ADMIN — CHLORHEXIDINE GLUCONATE 15 MILLILITER(S): 213 SOLUTION TOPICAL at 17:17

## 2022-09-07 RX ADMIN — Medication 400 MILLIGRAM(S): at 18:45

## 2022-09-07 RX ADMIN — Medication 102 MILLIGRAM(S): at 17:18

## 2022-09-07 RX ADMIN — DEXMEDETOMIDINE HYDROCHLORIDE IN 0.9% SODIUM CHLORIDE 11.5 MICROGRAM(S)/KG/HR: 4 INJECTION INTRAVENOUS at 19:14

## 2022-09-07 RX ADMIN — FENTANYL CITRATE 4.27 MICROGRAM(S)/KG/HR: 50 INJECTION INTRAVENOUS at 16:33

## 2022-09-07 RX ADMIN — HEPARIN SODIUM 5000 UNIT(S): 5000 INJECTION INTRAVENOUS; SUBCUTANEOUS at 22:20

## 2022-09-07 RX ADMIN — Medication 100 MILLIGRAM(S): at 01:16

## 2022-09-07 RX ADMIN — CHLORHEXIDINE GLUCONATE 1 APPLICATION(S): 213 SOLUTION TOPICAL at 05:21

## 2022-09-07 RX ADMIN — Medication 8.63 MICROGRAM(S)/KG/MIN: at 10:20

## 2022-09-07 RX ADMIN — Medication 100 MILLIGRAM(S): at 10:20

## 2022-09-07 RX ADMIN — PANTOPRAZOLE SODIUM 40 MILLIGRAM(S): 20 TABLET, DELAYED RELEASE ORAL at 11:35

## 2022-09-07 RX ADMIN — PROPOFOL 2.56 MICROGRAM(S)/KG/MIN: 10 INJECTION, EMULSION INTRAVENOUS at 19:14

## 2022-09-07 NOTE — PROGRESS NOTE ADULT - SUBJECTIVE AND OBJECTIVE BOX
LOBO GHOSH  [id number]    Subjective:    Patient seen and examined, remains in ICU intubated. 40% FiO2 - stable.     Objective:  T(C): 37 (09-07-22 @ 07:01), Max: 37.8 (09-06-22 @ 22:01)  HR: 92 (09-07-22 @ 07:00) (90 - 131)  BP: 119/59 (09-07-22 @ 07:00) (110/55 - 157/76)  RR: 15 (09-07-22 @ 07:00) (14 - 42)  SpO2: 98% (09-07-22 @ 07:00) (68% - 100%)  Wt(kg): --   09-07    137  |  101  |  8   ----------------------------<  139<H>  4.2   |  28  |  0.64    Ca    7.6<L>      07 Sep 2022 05:34  Phos  3.2     09-07  Mg     3.2     09-07                          10.6   18.68 )-----------( 293      ( 07 Sep 2022 05:34 )             31.4       09-06 @ 07:01  -  09-07 @ 07:00  --------------------------------------------------------  IN: 1422.2 mL / OUT: 1575 mL / NET: -152.8 mL    09-07 @ 07:01  -  09-07 @ 07:33  --------------------------------------------------------  IN: 166.8 mL / OUT: 0 mL / NET: 166.8 mL      PHYSICAL EXAM:    >> General: NAD   >> HEENT: Generalized swelling and ecchymosis, dressings CDI, JOLEEN serosang.    >> Cardiovascular: RRR  >> Lungs: Sedated and intubated, FiO2 40%. ET in position.   >> Abdomen: Soft. Dressing CDI.   >> Extremities: Unremarkable.             MEDICATIONS  (STANDING):  ceFAZolin   IVPB 2000 milliGRAM(s) IV Intermittent every 8 hours  cefTRIAXone   IVPB 2000 milliGRAM(s) IV Intermittent every 24 hours  chlorhexidine 0.12% Liquid 15 milliLiter(s) Oral Mucosa two times a day  chlorhexidine 2% Cloths 1 Application(s) Topical <User Schedule>  dexAMETHasone  IVPB 10 milliGRAM(s) IV Intermittent every 12 hours  fentaNYL   Infusion. 0.5 MICROgram(s)/kG/Hr (4.27 mL/Hr) IV Continuous <Continuous>  influenza   Vaccine 0.5 milliLiter(s) IntraMuscular once  lactated ringers. 1000 milliLiter(s) (80 mL/Hr) IV Continuous <Continuous>  norepinephrine Infusion 0.05 MICROgram(s)/kG/Min (8 mL/Hr) IV Continuous <Continuous>  propofol Infusion 5 MICROgram(s)/kG/Min (2.56 mL/Hr) IV Continuous <Continuous>    MEDICATIONS  (PRN):  acetaminophen   IVPB .. 1000 milliGRAM(s) IV Intermittent once PRN Mild Pain (1 - 3)  ondansetron Injectable 4 milliGRAM(s) IV Push every 6 hours PRN Nausea and/or Vomiting

## 2022-09-07 NOTE — PROGRESS NOTE ADULT - ATTENDING COMMENTS
acute hypoxemic respiratory failure with aspiration vs negative pressure pulmonary edema  physical as above  no air leak on cuff deflation; will continue with decadron and reassess for extubation tomorrow  the shunt has improved markedly, now on 40% oxygen with PEEP 5  continue ancef; doubt pneumonia  RASS -3 to -4 with precedex/fentanyl/propofol  requiring some NE from sedative effects for MAP>65

## 2022-09-07 NOTE — PROGRESS NOTE ADULT - ASSESSMENT
32F undergoing male to female transformation, hx facial feminization, b/l breast implants, admitted for elective facial feminization surgery, extubated post-operatively, but had copious bloody secretions from nose and mouth, then became hypoxic while she was supine, suspect aspiration pneumonitis. transferred to SICU for hypoxic respiratory failure likely secondary to aspiration pneumonitis with subsequent ARDS.    NEURO: tylenol PRN pain. Sedated (precedex)  CV: hemodynamically stable. Midodrine added, low BP (9/7). Fluids stopped  PULM: Intubated AC (pt declines nasal canula), POCUS with few B lines, but not significant, b/l consolidation (L>R). Failed leak test (9/7), reassess in afternoon. + bilateral pleural opacities, ARDS post aspiration pneumonitis  GI/FEN: keep NPO for now given tenuous respiratory status,   PLASTICS: cont usual regimen of decadron 10q12h until dc home (usually till POD#2) per Plastics   : TOV, uretheral catheter  ENDO: ISS  ID: andrew-op ppx: ancef x 3 days (9/6--), pending cxs   PPX: SCDs, NO SQH.    LINES: x3 PIVs, trisha (9/6--)   WOUNDS/DRAINS: intranasal and intraoral wounds. drain under scalp (+JPx1)

## 2022-09-07 NOTE — PROGRESS NOTE ADULT - SUBJECTIVE AND OBJECTIVE BOX
O/N: Patient uncomfortable and refusing to use hi flow NC with hypoxic events. decision to intubate was made in PM + pressors.    HPI: 32yFemale (undergoing male to female transformation), hx facial feminization surgery, b/l breast implants, admitted for elective facial feminization, extubated post-operatively, but had copious bloody secretions from nose and mouth, then became hypoxic while she was supine, suspect aspiration pneumonitis with subsequent ARDS.     SURGERY:  9/6: (facial feminization) Liposuction to abdomen with fat grafting to face, submental fat excision and scar revision. Frontal sinus set back and supraorbital rim contour, genioplasty, bilateral mandible angle reduction, submental fat excision and platysmaplasty      PHYSICAL EXAM:     General: Well appearing resting comfortably in bed in no acute distress, intubated & sedated  Neuro: sedated, unable to assess.   HEENT: Normocephalic, atraumatic, trachea midline, no JVD. incisions intranasally and intraorally, dried blood to nose and mouth, but no active bleeding.   Heart: Regular S1/S2, no murmurs rubs or gallops   Lungs: Unlabored breathing; Decreased breath sounds bilaterally L>R, no wheezing, rales, rhonchi, or stridor noted.   Abdomen: Soft, non-distended, normoactive bowel sounds throughout, no tenderness to palpation in all 4 quadrants   Upper Extremities: No edema, freely mobile bilaterally   Lower Extremities: No edema, SCDs in place, feet warm bilaterally   Skin: Warm, non-diaphoretic throughout   : uretheral catheter well positioned  Lines/tubes: x3 IV lines, 1 PICC line (9/6), intubated with soft suctioning, x1 scalp JOLEEN            PAST MEDICAL & SURGICAL HISTORY:  Gender identity disorder, unspecified      H/O bilateral breast implants        Allergies    No Known Allergies    Intolerances      MEDICATIONS  (STANDING):  ceFAZolin   IVPB 2000 milliGRAM(s) IV Intermittent every 8 hours  chlorhexidine 0.12% Liquid 15 milliLiter(s) Oral Mucosa two times a day  chlorhexidine 2% Cloths 1 Application(s) Topical <User Schedule>  dexAMETHasone  IVPB 10 milliGRAM(s) IV Intermittent every 12 hours  dexMEDEtomidine Infusion 0.5 MICROgram(s)/kG/Hr (11.5 mL/Hr) IV Continuous <Continuous>  fentaNYL   Infusion. 0.5 MICROgram(s)/kG/Hr (4.27 mL/Hr) IV Continuous <Continuous>  influenza   Vaccine 0.5 milliLiter(s) IntraMuscular once  norepinephrine Infusion 0.05 MICROgram(s)/kG/Min (8.63 mL/Hr) IV Continuous <Continuous>  pantoprazole  Injectable 40 milliGRAM(s) IV Push daily  propofol Infusion 5 MICROgram(s)/kG/Min (2.56 mL/Hr) IV Continuous <Continuous>    MEDICATIONS  (PRN):  acetaminophen   IVPB .. 1000 milliGRAM(s) IV Intermittent once PRN Mild Pain (1 - 3)  ondansetron Injectable 4 milliGRAM(s) IV Push every 6 hours PRN Nausea and/or Vomiting      Labs:                         10.6   18.68 )-----------( 293      ( 07 Sep 2022 05:34 )             31.4     09-07    137  |  101  |  8   ----------------------------<  139<H>  4.2   |  28  |  0.64    Ca    7.6<L>      07 Sep 2022 05:34  Phos  3.2     09-07  Mg     3.2     09-07    TPro  x   /  Alb  3.2<L>  /  TBili  x   /  DBili  x   /  AST  x   /  ALT  x   /  AlkPhos  x   09-07    CAPILLARY BLOOD GLUCOSE            PT/INR - ( 06 Sep 2022 17:52 )   PT: 13.0 sec;   INR: 1.09          PTT - ( 06 Sep 2022 17:52 )  PTT:20.9 sec          Vital Signs:   Vital Signs Last 24 Hrs  T(C): 37.1 (07 Sep 2022 10:30), Max: 37.8 (06 Sep 2022 22:01)  T(F): 98.8 (07 Sep 2022 10:30), Max: 100.1 (06 Sep 2022 22:01)  HR: 80 (07 Sep 2022 11:00) (80 - 131)  BP: 98/57 (07 Sep 2022 11:00) (84/49 - 157/76)  BP(mean): 73 (07 Sep 2022 11:00) (62 - 136)  RR: 15 (07 Sep 2022 07:00) (14 - 42)  SpO2: 99% (07 Sep 2022 11:00) (68% - 100%)    Parameters below as of 07 Sep 2022 11:00  Patient On (Oxygen Delivery Method): ventilator    O2 Concentration (%): 40  Mode: AC/ CMV (Assist Control/ Continuous Mandatory Ventilation), RR (machine): 15, TV (machine): 480, FiO2: 40, PEEP: 5, ITime: 1, MAP: 10, PIP: 18    Input/Output:   I&O's Detail    06 Sep 2022 07:01  -  07 Sep 2022 07:00  --------------------------------------------------------  IN:    FentaNYL: 96.6 mL    IV PiggyBack: 50 mL    IV PiggyBack: 50 mL    IV PiggyBack: 100 mL    Lactated Ringers: 720 mL    Norepinephrine: 47.6 mL    Propofol: 362.6 mL  Total IN: 1426.8 mL    OUT:    Bulb (mL): 10 mL    Indwelling Catheter - Urethral (mL): 1565 mL    Nasogastric/Oral tube (mL): 0 mL  Total OUT: 1575 mL    Total NET: -148.2 mL      07 Sep 2022 07:01  -  07 Sep 2022 11:43  --------------------------------------------------------  IN:    Dexmedetomidine: 52.9 mL    FentaNYL: 55.2 mL    IV PiggyBack: 100 mL    IV PiggyBack: 50 mL    Lactated Ringers: 80 mL    Norepinephrine: 12.1 mL    Propofol: 104.9 mL  Total IN: 455.1 mL    OUT:    Bulb (mL): 0 mL    Indwelling Catheter - Urethral (mL): 275 mL    Norepinephrine: 0 mL  Total OUT: 275 mL    Total NET: 180.1 mL        Daily     Daily       Weight (kg): 92 (09-06-22 @ 21:00)    < from: Xray Chest 1 View-PORTABLE IMMEDIATE (Xray Chest 1 View-PORTABLE IMMEDIATE .) (09.07.22 @ 09:25) >  < from: Xray Chest 1 View-PORTABLE IMMEDIATE (Xray Chest 1 View-PORTABLE IMMEDIATE .) (09.07.22 @ 09:25) >  IMPRESSION:    The endotracheal tube has been minimally advanced since prior exam   earlier same day. The tip of the endotracheal tube is at the inferior   margin of the clavicles.    No other change. Nasogastric tube tip in stomach. Lung infiltrates. No   pneumothorax.    < end of copied text >  < from: Xray Chest 1 View AP/PA (09.06.22 @ 16:29) >    INTERPRETATION:  TECHNIQUE: Single portable view of the chest.    COMPARISON:  None    CLINICAL HISTORY: Desaturation    FINDINGS:    Single frontal view of the chest demonstrates large bilateral perihilar   infiltrates. The cardiomediastinal silhouette is normal. No acute osseous   abnormalities. Overlying EKG leads and wires are noted    IMPRESSION: Large bilateral perihilar infiltrates.    < end of copied text >

## 2022-09-08 LAB
ANION GAP SERPL CALC-SCNC: 9 MMOL/L — SIGNIFICANT CHANGE UP (ref 5–17)
BASE EXCESS BLDA CALC-SCNC: 4.3 MMOL/L — HIGH (ref -2–3)
BUN SERPL-MCNC: 10 MG/DL — SIGNIFICANT CHANGE UP (ref 7–23)
CALCIUM SERPL-MCNC: 7.5 MG/DL — LOW (ref 8.4–10.5)
CHLORIDE SERPL-SCNC: 99 MMOL/L — SIGNIFICANT CHANGE UP (ref 96–108)
CO2 BLDA-SCNC: 28 MMOL/L — HIGH (ref 19–24)
CO2 SERPL-SCNC: 26 MMOL/L — SIGNIFICANT CHANGE UP (ref 22–31)
CREAT SERPL-MCNC: 0.48 MG/DL — LOW (ref 0.5–1.3)
EGFR: 127 ML/MIN/1.73M2 — SIGNIFICANT CHANGE UP
GLUCOSE SERPL-MCNC: 142 MG/DL — HIGH (ref 70–99)
HCO3 BLDA-SCNC: 27 MMOL/L — SIGNIFICANT CHANGE UP (ref 21–28)
HCT VFR BLD CALC: 26.9 % — LOW (ref 34.5–45)
HGB BLD-MCNC: 8.9 G/DL — LOW (ref 11.5–15.5)
MAGNESIUM SERPL-MCNC: 2.8 MG/DL — HIGH (ref 1.6–2.6)
MCHC RBC-ENTMCNC: 30.9 PG — SIGNIFICANT CHANGE UP (ref 27–34)
MCHC RBC-ENTMCNC: 33.1 GM/DL — SIGNIFICANT CHANGE UP (ref 32–36)
MCV RBC AUTO: 93.4 FL — SIGNIFICANT CHANGE UP (ref 80–100)
NRBC # BLD: 0 /100 WBCS — SIGNIFICANT CHANGE UP (ref 0–0)
PCO2 BLDA: 34 MMHG — SIGNIFICANT CHANGE UP (ref 32–35)
PH BLDA: 7.51 — HIGH (ref 7.35–7.45)
PHOSPHATE SERPL-MCNC: 1.9 MG/DL — LOW (ref 2.5–4.5)
PLATELET # BLD AUTO: 252 K/UL — SIGNIFICANT CHANGE UP (ref 150–400)
PO2 BLDA: 195 MMHG — HIGH (ref 83–108)
POTASSIUM SERPL-MCNC: 4.1 MMOL/L — SIGNIFICANT CHANGE UP (ref 3.5–5.3)
POTASSIUM SERPL-SCNC: 4.1 MMOL/L — SIGNIFICANT CHANGE UP (ref 3.5–5.3)
RBC # BLD: 2.88 M/UL — LOW (ref 3.8–5.2)
RBC # FLD: 13.6 % — SIGNIFICANT CHANGE UP (ref 10.3–14.5)
SAO2 % BLDA: 99.1 % — HIGH (ref 94–98)
SODIUM SERPL-SCNC: 134 MMOL/L — LOW (ref 135–145)
WBC # BLD: 14.22 K/UL — HIGH (ref 3.8–10.5)
WBC # FLD AUTO: 14.22 K/UL — HIGH (ref 3.8–10.5)

## 2022-09-08 PROCEDURE — 71045 X-RAY EXAM CHEST 1 VIEW: CPT | Mod: 26

## 2022-09-08 PROCEDURE — 99233 SBSQ HOSP IP/OBS HIGH 50: CPT | Mod: GC

## 2022-09-08 RX ORDER — HYDROMORPHONE HYDROCHLORIDE 2 MG/ML
0.25 INJECTION INTRAMUSCULAR; INTRAVENOUS; SUBCUTANEOUS ONCE
Refills: 0 | Status: DISCONTINUED | OUTPATIENT
Start: 2022-09-08 | End: 2022-09-08

## 2022-09-08 RX ORDER — ACETAMINOPHEN 500 MG
325 TABLET ORAL EVERY 4 HOURS
Refills: 0 | Status: DISCONTINUED | OUTPATIENT
Start: 2022-09-08 | End: 2022-09-09

## 2022-09-08 RX ORDER — LANOLIN ALCOHOL/MO/W.PET/CERES
5 CREAM (GRAM) TOPICAL AT BEDTIME
Refills: 0 | Status: DISCONTINUED | OUTPATIENT
Start: 2022-09-08 | End: 2022-09-09

## 2022-09-08 RX ORDER — MAGNESIUM SULFATE 500 MG/ML
1 VIAL (ML) INJECTION ONCE
Refills: 0 | Status: COMPLETED | OUTPATIENT
Start: 2022-09-08 | End: 2022-09-08

## 2022-09-08 RX ORDER — ACETAMINOPHEN 500 MG
650 TABLET ORAL EVERY 6 HOURS
Refills: 0 | Status: DISCONTINUED | OUTPATIENT
Start: 2022-09-08 | End: 2022-09-09

## 2022-09-08 RX ORDER — SODIUM CHLORIDE 9 MG/ML
500 INJECTION INTRAMUSCULAR; INTRAVENOUS; SUBCUTANEOUS ONCE
Refills: 0 | Status: COMPLETED | OUTPATIENT
Start: 2022-09-08 | End: 2022-09-08

## 2022-09-08 RX ORDER — SODIUM CHLORIDE 9 MG/ML
1000 INJECTION INTRAMUSCULAR; INTRAVENOUS; SUBCUTANEOUS ONCE
Refills: 0 | Status: COMPLETED | OUTPATIENT
Start: 2022-09-08 | End: 2022-09-08

## 2022-09-08 RX ADMIN — CHLORHEXIDINE GLUCONATE 1 APPLICATION(S): 213 SOLUTION TOPICAL at 05:54

## 2022-09-08 RX ADMIN — Medication 650 MILLIGRAM(S): at 23:13

## 2022-09-08 RX ADMIN — Medication 1000 MILLIGRAM(S): at 11:23

## 2022-09-08 RX ADMIN — HYDROMORPHONE HYDROCHLORIDE 0.25 MILLIGRAM(S): 2 INJECTION INTRAMUSCULAR; INTRAVENOUS; SUBCUTANEOUS at 15:00

## 2022-09-08 RX ADMIN — PROPOFOL 2.56 MICROGRAM(S)/KG/MIN: 10 INJECTION, EMULSION INTRAVENOUS at 03:41

## 2022-09-08 RX ADMIN — SODIUM CHLORIDE 500 MILLILITER(S): 9 INJECTION INTRAMUSCULAR; INTRAVENOUS; SUBCUTANEOUS at 11:29

## 2022-09-08 RX ADMIN — Medication 650 MILLIGRAM(S): at 23:44

## 2022-09-08 RX ADMIN — DEXMEDETOMIDINE HYDROCHLORIDE IN 0.9% SODIUM CHLORIDE 11.5 MICROGRAM(S)/KG/HR: 4 INJECTION INTRAVENOUS at 00:44

## 2022-09-08 RX ADMIN — HEPARIN SODIUM 5000 UNIT(S): 5000 INJECTION INTRAVENOUS; SUBCUTANEOUS at 22:17

## 2022-09-08 RX ADMIN — Medication 85 MILLIMOLE(S): at 09:35

## 2022-09-08 RX ADMIN — HYDROMORPHONE HYDROCHLORIDE 0.25 MILLIGRAM(S): 2 INJECTION INTRAMUSCULAR; INTRAVENOUS; SUBCUTANEOUS at 14:45

## 2022-09-08 RX ADMIN — Medication 5 MILLIGRAM(S): at 22:39

## 2022-09-08 RX ADMIN — Medication 100 MILLIGRAM(S): at 00:41

## 2022-09-08 RX ADMIN — SODIUM CHLORIDE 1000 MILLILITER(S): 9 INJECTION INTRAMUSCULAR; INTRAVENOUS; SUBCUTANEOUS at 09:36

## 2022-09-08 RX ADMIN — PANTOPRAZOLE SODIUM 40 MILLIGRAM(S): 20 TABLET, DELAYED RELEASE ORAL at 11:18

## 2022-09-08 RX ADMIN — Medication 650 MILLIGRAM(S): at 17:16

## 2022-09-08 RX ADMIN — Medication 400 MILLIGRAM(S): at 11:18

## 2022-09-08 RX ADMIN — Medication 102 MILLIGRAM(S): at 05:54

## 2022-09-08 RX ADMIN — Medication 100 MILLIGRAM(S): at 10:39

## 2022-09-08 RX ADMIN — HEPARIN SODIUM 5000 UNIT(S): 5000 INJECTION INTRAVENOUS; SUBCUTANEOUS at 05:54

## 2022-09-08 RX ADMIN — Medication 650 MILLIGRAM(S): at 18:13

## 2022-09-08 RX ADMIN — CHLORHEXIDINE GLUCONATE 15 MILLILITER(S): 213 SOLUTION TOPICAL at 05:55

## 2022-09-08 RX ADMIN — Medication 100 MILLIGRAM(S): at 17:09

## 2022-09-08 RX ADMIN — CHLORHEXIDINE GLUCONATE 15 MILLILITER(S): 213 SOLUTION TOPICAL at 17:09

## 2022-09-08 RX ADMIN — HEPARIN SODIUM 5000 UNIT(S): 5000 INJECTION INTRAVENOUS; SUBCUTANEOUS at 14:06

## 2022-09-08 RX ADMIN — DEXMEDETOMIDINE HYDROCHLORIDE IN 0.9% SODIUM CHLORIDE 11.5 MICROGRAM(S)/KG/HR: 4 INJECTION INTRAVENOUS at 07:35

## 2022-09-08 NOTE — PROGRESS NOTE ADULT - SUBJECTIVE AND OBJECTIVE BOX
LOBO GHOSH  [id number]    Subjective:    Patient seen and examined, remains sedated on vent. Weaning from Precedex this morning.        Objective:  T(C): 36.6 (09-08-22 @ 05:01), Max: 38.2 (09-07-22 @ 17:25)  HR: 61 (09-08-22 @ 07:00) (61 - 104)  BP: 93/54 (09-08-22 @ 07:00) (84/49 - 121/62)  RR: 15 (09-08-22 @ 07:00) (15 - 15)  SpO2: 100% (09-08-22 @ 07:00) (92% - 100%)  Wt(kg): --   09-08    134<L>  |  99  |  10  ----------------------------<  142<H>  4.1   |  26  |  0.48<L>    Ca    7.5<L>      08 Sep 2022 05:33  Phos  1.9     09-08  Mg     2.8     09-08    TPro  x   /  Alb  3.2<L>  /  TBili  x   /  DBili  x   /  AST  x   /  ALT  x   /  AlkPhos  x   09-07                        8.9    14.22 )-----------( 252      ( 08 Sep 2022 05:33 )             26.9       09-07 @ 07:01  -  09-08 @ 07:00  --------------------------------------------------------  IN: 1775.8 mL / OUT: 1875 mL / NET: -99.2 mL      PHYSICAL EXAM:    >> General: NAD   >> HEENT: Generalized swelling and ecchymosis, dressings CDI, JOLEEN serosang.    >> Cardiovascular: RRR  >> Lungs: Sedated and intubated, FiO2 40%. ET in position.   >> Abdomen: Soft. Dressing CDI.   >> Extremities: Unremarkable.               MEDICATIONS  (STANDING):  ceFAZolin   IVPB 2000 milliGRAM(s) IV Intermittent every 8 hours  chlorhexidine 0.12% Liquid 15 milliLiter(s) Oral Mucosa two times a day  chlorhexidine 2% Cloths 1 Application(s) Topical <User Schedule>  dexAMETHasone  IVPB 10 milliGRAM(s) IV Intermittent every 12 hours  dexMEDEtomidine Infusion 0.5 MICROgram(s)/kG/Hr (11.5 mL/Hr) IV Continuous <Continuous>  fentaNYL   Infusion. 0.5 MICROgram(s)/kG/Hr (4.27 mL/Hr) IV Continuous <Continuous>  heparin   Injectable 5000 Unit(s) SubCutaneous every 8 hours  influenza   Vaccine 0.5 milliLiter(s) IntraMuscular once  norepinephrine Infusion 0.05 MICROgram(s)/kG/Min (8.63 mL/Hr) IV Continuous <Continuous>  pantoprazole  Injectable 40 milliGRAM(s) IV Push daily  propofol Infusion 5 MICROgram(s)/kG/Min (2.56 mL/Hr) IV Continuous <Continuous>  sodium phosphate IVPB 30 milliMole(s) IV Intermittent once    MEDICATIONS  (PRN):  acetaminophen   IVPB .. 1000 milliGRAM(s) IV Intermittent once PRN Mild Pain (1 - 3)  ondansetron Injectable 4 milliGRAM(s) IV Push every 6 hours PRN Nausea and/or Vomiting

## 2022-09-08 NOTE — PROGRESS NOTE ADULT - ASSESSMENT
32F undergoing male to female transformation, hx facial feminization, b/l breast implants, admitted for elective facial feminization surgery, extubated post-operatively, but had copious bloody secretions from nose and mouth, then became hypoxic while she was supine, suspect aspiration pneumonitis. transferred to SICU for hypoxic respiratory failure likely secondary to aspiration pneumonitis with subsequent ARDS.    NEURO: tylenol PRN pain. Sedated (precedex)  CV: hemodynamically stable. Midodrine added, low BP (9/7). Fluids stopped  PULM: Intubated AC (pt declines nasal canula), POCUS with few B lines, but not significant, b/l consolidation (L>R). Failed leak test (9/7), reassess in afternoon. + bilateral pleural opacities, ARDS post aspiration pneumonitis  GI/FEN: keep NPO for now given tenuous respiratory status,   PLASTICS: cont usual regimen of decadron 10q12h until dc home (usually till POD#2) per Plastics   : TOV, uretheral catheter  ENDO: ISS  ID: andrew-op ppx: ancef x 3 days (9/6--), pending cxs   PPX: SCDs, NO SQH.    LINES: x3 PIVs, trisha (9/6--)   WOUNDS/DRAINS: intranasal and intraoral wounds. drain under scalp (+JPx1)        32F undergoing male to female transformation, hx facial feminization, b/l breast implants, admitted for elective facial feminization surgery, extubated post-operatively, but had copious bloody secretions from nose and mouth, then became hypoxic while she was supine, suspect aspiration pneumonitis. transferred to SICU for hypoxic respiratory failure likely secondary to aspiration pneumonitis with subsequent ARDS. Extubated this AM, AAOB, breathing w/ face tent, AOx3.    9/8 events: extubated, dc decadron, Orthostatic- given 1.5L bolus of NS x1. Remove frank. Advance diet to CLD    NEURO: tylenol PRN pain PO  CV: levophed. 1.5L bolus of NS x1 orthostatic  PULM: Extubated (9/8 AM),  02 face tent  GI/FEN: CLD, OGT LIWS, protonix.   PLASTICS: cont usual regimen of decadron 10q12h   : frank d/piyush (9/8)  ENDO: ISS  ID: andrew-op ppx: ancef (9/6--), pending cxs   PPX: SCDs, SQH, goal AOOB (9/8)  LINES: x3 PIVs, oscar (9/6--)   WOUNDS/DRAINS: intranasal and intraoral wounds. drain under scalp (+JPx1). Ice collar facial swelling    32F undergoing male to female transformation, hx facial feminization, b/l breast implants, admitted for elective facial feminization surgery, extubated post-operatively, but had copious bloody secretions from nose and mouth, then became hypoxic while she was supine, suspect aspiration pneumonitis. transferred to SICU for hypoxic respiratory failure likely secondary to aspiration pneumonitis with subsequent ARDS. Extubated this AM, AAOB, breathing w/ face tent, AOx3.    9/8 events: extubated, dc decadron, Orthostatic- given 1.5L bolus of NS x1. Remove frank. Advance diet to CLD    NEURO: tylenol PRN pain PO  CV: levophed. 1.5L bolus of NS x1 orthostatic  PULM: Extubated (9/8 AM),  02 face tent  GI/FEN: CLD, OGT LIWS, protonix.   PLASTICS: DC decadron  : frank d/piyush (9/8)  ENDO: ISS  ID: andrew-op ppx: ancef (9/6--), pending cxs   PPX: SCDs, SQH, goal AOOB (9/8)  LINES: x3 PIVs, oscar (9/6--)   WOUNDS/DRAINS: intranasal and intraoral wounds. drain under scalp (+JPx1). Ice collar facial swelling

## 2022-09-08 NOTE — PROGRESS NOTE ADULT - NS ATTEND AMEND GEN_ALL_CORE FT
s/p rhinoplasty with acute hypoxemic respiratory failure, now extubated  physical as above  would stop steroids as may be contributing to labile affect  encourage wearing oxygen and IS; for now on RA SaO2 89-94%  somewhat orthostatic but good UO; started liquids and given extra liter of crystalloid  ancef continues per plastics

## 2022-09-08 NOTE — PROGRESS NOTE ADULT - SUBJECTIVE AND OBJECTIVE BOX
Interval Events:  Continues to have Bloody sputum   Patient seen and examined at bedside.      Allergies    No Known Allergies    Intolerances        Vital Signs Last 24 Hrs  T(C): 36.6 (08 Sep 2022 05:01), Max: 38.2 (07 Sep 2022 17:25)  T(F): 97.9 (08 Sep 2022 05:01), Max: 100.7 (07 Sep 2022 17:25)  HR: 61 (08 Sep 2022 06:00) (61 - 104)  BP: 97/52 (08 Sep 2022 06:00) (84/49 - 121/62)  BP(mean): 68 (08 Sep 2022 06:00) (62 - 85)  RR: 15 (08 Sep 2022 07:00) (15 - 15)  SpO2: 100% (08 Sep 2022 06:00) (92% - 100%)    Parameters below as of 08 Sep 2022 07:00  Patient On (Oxygen Delivery Method): ventilator    O2 Concentration (%): 40    09-06 @ 07:01  -  09-07 @ 07:00  --------------------------------------------------------  IN: 1426.8 mL / OUT: 1575 mL / NET: -148.2 mL    09-07 @ 07:01  -  09-08 @ 06:55  --------------------------------------------------------  IN: 1760 mL / OUT: 1875 mL / NET: -115 mL      09-06 @ 07:01  -  09-07 @ 07:00  --------------------------------------------------------  IN: 1426.8 mL / OUT: 1575 mL / NET: -148.2 mL    09-07 @ 07:01  -  09-08 @ 06:55  --------------------------------------------------------  IN: 1760 mL / OUT: 1875 mL / NET: -115 mL        Physical Exam:   General: Well appearing resting comfortably in bed in no acute distress, intubated & sedated  Neuro: sedated, unable to assess.   HEENT: Normocephalic, atraumatic, trachea midline, no JVD. incisions intranasally and intraorally, dried blood to nose and mouth, but no active bleeding.   Heart: Regular S1/S2, no murmurs rubs or gallops   Lungs: Unlabored breathing; Decreased breath sounds bilaterally L>R, no wheezing, rales, rhonchi, or stridor noted.   Abdomen: Soft, non-distended, normoactive bowel sounds throughout, no tenderness to palpation in all 4 quadrants   Upper Extremities: No edema, freely mobile bilaterally   Lower Extremities: No edema, SCDs in place, feet warm bilaterally   Skin: Warm, non-diaphoretic throughout   : uretheral catheter well positioned  Lines/tubes: x3 IV lines, 1 PICC line (9/6), intubated with soft suctioning, x1 scalp JOLEEN      LABS:  ABG - ( 06 Sep 2022 19:46 )  pH, Arterial: 7.38  pH, Blood: x     /  pCO2: 43    /  pO2: 68    / HCO3: 25    / Base Excess: 0.0   /  SaO2: 93.8                CBC Full  -  ( 08 Sep 2022 05:33 )  WBC Count : 14.22 K/uL  RBC Count : 2.88 M/uL  Hemoglobin : 8.9 g/dL  Hematocrit : 26.9 %  Platelet Count - Automated : 252 K/uL  Mean Cell Volume : 93.4 fl  Mean Cell Hemoglobin : 30.9 pg  Mean Cell Hemoglobin Concentration : 33.1 gm/dL  Auto Neutrophil # : x  Auto Lymphocyte # : x  Auto Monocyte # : x  Auto Eosinophil # : x  Auto Basophil # : x  Auto Neutrophil % : x  Auto Lymphocyte % : x  Auto Monocyte % : x  Auto Eosinophil % : x  Auto Basophil % : x    09-08    134<L>  |  99  |  10  ----------------------------<  142<H>  4.1   |  26  |  0.48<L>    Ca    7.5<L>      08 Sep 2022 05:33  Phos  1.9     09-08  Mg     2.8     09-08    TPro  x   /  Alb  3.2<L>  /  TBili  x   /  DBili  x   /  AST  x   /  ALT  x   /  AlkPhos  x   09-07    PT/INR - ( 06 Sep 2022 17:52 )   PT: 13.0 sec;   INR: 1.09          PTT - ( 06 Sep 2022 17:52 )  PTT:20.9 sec                RADIOLOGY & ADDITIONAL STUDIES (The following images were personally reviewed):          A/p: 32F undergoing male to female transformation, hx facial feminization, b/l breast implants, admitted for elective facial feminization surgery, extubated post-operatively, but had copious bloody secretions from nose and mouth, then became hypoxic while she was supine, suspect aspiration pneumonitis with subsequent ARDS. Transferred to SICU for hypoxic respiratory failure and intubated. Started on levophed on 9/7 however now off of all pressors.     NEURO: tylenol PRN pain. Sedated precedex, propofol. fentanyl. Zofran.   CV: levophed, euvolemic and well perfused   PULM: Intubated AC 40/480/12/5,   GI/FEN: NPO, OGT LIWS, protonix.   PLASTICS: cont usual regimen of decadron 10q12h   : zac   ENDO: ISS  ID: [email or web address] ppx: ancef (9/6--), pending cxs   PPX: SCDs, SQH  LINES: x3 PIVs, oscar (9/6--)   WOUNDS/DRAINS: intranasal and intraoral wounds. drain under scalp (+JPx1)   Interval Events:  Patient seen and examined at bedside. Extubated in AM. Breathing spontaneously      Allergies    No Known Allergies    Intolerances        Vital Signs Last 24 Hrs  T(C): 36.6 (08 Sep 2022 05:01), Max: 38.2 (07 Sep 2022 17:25)  T(F): 97.9 (08 Sep 2022 05:01), Max: 100.7 (07 Sep 2022 17:25)  HR: 61 (08 Sep 2022 06:00) (61 - 104)  BP: 97/52 (08 Sep 2022 06:00) (84/49 - 121/62)  BP(mean): 68 (08 Sep 2022 06:00) (62 - 85)  RR: 15 (08 Sep 2022 07:00) (15 - 15)  SpO2: 100% (08 Sep 2022 06:00) (92% - 100%)    Parameters below as of 08 Sep 2022 07:00  Patient On (Oxygen Delivery Method): ventilator    O2 Concentration (%): 40    09-06 @ 07:01  -  09-07 @ 07:00  --------------------------------------------------------  IN: 1426.8 mL / OUT: 1575 mL / NET: -148.2 mL    09-07 @ 07:01  -  09-08 @ 06:55  --------------------------------------------------------  IN: 1760 mL / OUT: 1875 mL / NET: -115 mL      09-06 @ 07:01  -  09-07 @ 07:00  --------------------------------------------------------  IN: 1426.8 mL / OUT: 1575 mL / NET: -148.2 mL    09-07 @ 07:01  -  09-08 @ 06:55  --------------------------------------------------------  IN: 1760 mL / OUT: 1875 mL / NET: -115 mL        Physical Exam:   General: Well appearing resting comfortably in bed in no acute distress, intubated & sedated  Neuro: sedated, unable to assess.   HEENT: Normocephalic, atraumatic, trachea midline, no JVD. incisions intranasally and intraorally, dried blood to nose and mouth, but no active bleeding.   Heart: Regular S1/S2, no murmurs rubs or gallops   Lungs: Unlabored breathing; Decreased breath sounds bilaterally L>R, no wheezing, rales, rhonchi, or stridor noted.   Abdomen: Soft, non-distended, normoactive bowel sounds throughout, no tenderness to palpation in all 4 quadrants   Upper Extremities: No edema, freely mobile bilaterally   Lower Extremities: No edema, SCDs in place, feet warm bilaterally   Skin: Warm, non-diaphoretic throughout   : uretheral catheter well positioned  Lines/tubes: x3 IV lines, 1 PICC line (9/6), intubated with soft suctioning, x1 scalp JOLEEN      LABS:  ABG - ( 06 Sep 2022 19:46 )  pH, Arterial: 7.38  pH, Blood: x     /  pCO2: 43    /  pO2: 68    / HCO3: 25    / Base Excess: 0.0   /  SaO2: 93.8                CBC Full  -  ( 08 Sep 2022 05:33 )  WBC Count : 14.22 K/uL  RBC Count : 2.88 M/uL  Hemoglobin : 8.9 g/dL  Hematocrit : 26.9 %  Platelet Count - Automated : 252 K/uL  Mean Cell Volume : 93.4 fl  Mean Cell Hemoglobin : 30.9 pg  Mean Cell Hemoglobin Concentration : 33.1 gm/dL  Auto Neutrophil # : x  Auto Lymphocyte # : x  Auto Monocyte # : x  Auto Eosinophil # : x  Auto Basophil # : x  Auto Neutrophil % : x  Auto Lymphocyte % : x  Auto Monocyte % : x  Auto Eosinophil % : x  Auto Basophil % : x    09-08    134<L>  |  99  |  10  ----------------------------<  142<H>  4.1   |  26  |  0.48<L>    Ca    7.5<L>      08 Sep 2022 05:33  Phos  1.9     09-08  Mg     2.8     09-08    TPro  x   /  Alb  3.2<L>  /  TBili  x   /  DBili  x   /  AST  x   /  ALT  x   /  AlkPhos  x   09-07    PT/INR - ( 06 Sep 2022 17:52 )   PT: 13.0 sec;   INR: 1.09          PTT - ( 06 Sep 2022 17:52 )  PTT:20.9 sec                RADIOLOGY & ADDITIONAL STUDIES (The following images were personally reviewed):          A/p: 32F undergoing male to female transformation, hx facial feminization, b/l breast implants, admitted for elective facial feminization surgery, extubated post-operatively, but had copious bloody secretions from nose and mouth, then became hypoxic while she was supine, suspect aspiration pneumonitis with subsequent ARDS. Transferred to SICU for hypoxic respiratory failure and intubated. Started on levophed on 9/7 however now off of all pressors.     NEURO: tylenol PRN pain. Sedated precedex, propofol. fentanyl. Zofran.   CV: levophed, euvolemic and well perfused   PULM: Intubated AC 40/480/12/5,   GI/FEN: NPO, OGT LIWS, protonix.   PLASTICS: cont usual regimen of decadron 10q12h   : zac   ENDO: ISS  ID: andrew-op ppx: ancef (9/6--), pending cxs   PPX: SCDs, SQH  LINES: x3 PIVs, oscar (9/6--)   WOUNDS/DRAINS: intranasal and intraoral wounds. drain under scalp (+JPx1)   Interval Events:  Patient seen and examined at bedside. Extubated in AM. Breathing spontaneously      Allergies    No Known Allergies    Intolerances        Vital Signs Last 24 Hrs  T(C): 36.6 (08 Sep 2022 05:01), Max: 38.2 (07 Sep 2022 17:25)  T(F): 97.9 (08 Sep 2022 05:01), Max: 100.7 (07 Sep 2022 17:25)  HR: 61 (08 Sep 2022 06:00) (61 - 104)  BP: 97/52 (08 Sep 2022 06:00) (84/49 - 121/62)  BP(mean): 68 (08 Sep 2022 06:00) (62 - 85)  RR: 15 (08 Sep 2022 07:00) (15 - 15)  SpO2: 100% (08 Sep 2022 06:00) (92% - 100%)    Parameters below as of 08 Sep 2022 07:00  Patient On (Oxygen Delivery Method): ventilator    O2 Concentration (%): 40    09-06 @ 07:01  -  09-07 @ 07:00  --------------------------------------------------------  IN: 1426.8 mL / OUT: 1575 mL / NET: -148.2 mL    09-07 @ 07:01  -  09-08 @ 06:55  --------------------------------------------------------  IN: 1760 mL / OUT: 1875 mL / NET: -115 mL      09-06 @ 07:01  -  09-07 @ 07:00  --------------------------------------------------------  IN: 1426.8 mL / OUT: 1575 mL / NET: -148.2 mL    09-07 @ 07:01  -  09-08 @ 06:55  --------------------------------------------------------  IN: 1760 mL / OUT: 1875 mL / NET: -115 mL        Physical Exam:   General: Well appearing resting comfortably in bed in no acute distress, extubated  Neuro: AOx3, 5/5 strength extremities bilaterally  HEENT: Normocephalic, atraumatic, trachea midline, no JVD. incisions intranasally and intraorally, dried blood to nose and mouth, but no active bleeding. Ice packs lpaced on face, both cheeks  Heart: Regular S1/S2, no murmurs rubs or gallops   Lungs: Unlabored breathing; Decreased breath sounds bilaterally L>R, no wheezing, rales, rhonchi, or stridor noted. Face tent 02  Abdomen: Soft, non-distended, normoactive bowel sounds throughout, no tenderness to palpation in all 4 quadrants   Upper Extremities: No edema, freely mobile bilaterally   Lower Extremities: No edema, SCDs in place, feet warm bilaterally   Skin: Warm, non-diaphoretic throughout   : uretheral catheter removed  Lines/tubes: x3 IV lines, 1 PICC line (9/6), x1 scalp JOLEEN      LABS:  ABG - ( 08 Sep 2022 08:03 )  pH, Arterial: 7.51  pH, Blood: x     /  pCO2: 34    /  pO2: 195   / HCO3: 27    / Base Excess: 4.3   /  SaO2: 99.1        CBC Full  -  ( 08 Sep 2022 05:33 )  WBC Count : 14.22 K/uL  RBC Count : 2.88 M/uL  Hemoglobin : 8.9 g/dL  Hematocrit : 26.9 %  Platelet Count - Automated : 252 K/uL  Mean Cell Volume : 93.4 fl  Mean Cell Hemoglobin : 30.9 pg  Mean Cell Hemoglobin Concentration : 33.1 gm/dL  Auto Neutrophil # : x  Auto Lymphocyte # : x  Auto Monocyte # : x  Auto Eosinophil # : x  Auto Basophil # : x  Auto Neutrophil % : x  Auto Lymphocyte % : x  Auto Monocyte % : x  Auto Eosinophil % : x  Auto Basophil % : x    09-08    134<L>  |  99  |  10  ----------------------------<  142<H>  4.1   |  26  |  0.48<L>    Ca    7.5<L>      08 Sep 2022 05:33  Phos  1.9     09-08  Mg     2.8     09-08    TPro  x   /  Alb  3.2<L>  /  TBili  x   /  DBili  x   /  AST  x   /  ALT  x   /  AlkPhos  x   09-07    PT/INR - ( 06 Sep 2022 17:52 )   PT: 13.0 sec;   INR: 1.09          PTT - ( 06 Sep 2022 17:52 )  PTT:20.9 sec    RADIOLOGY & ADDITIONAL STUDIES (The following images were personally reviewed):  < from: Xray Chest 1 View-PORTABLE IMMEDIATE (Xray Chest 1 View-PORTABLE IMMEDIATE .) (09.07.22 @ 09:25) >  ACC: [id number] EXAM:  XR CHEST PORTABLE IMMED 1V                          PROCEDURE DATE:  09/07/2022          INTERPRETATION:  Portable chest    HISTORY: Endotracheal tube adjustment    IMPRESSION:    The endotracheal tube has been minimally advanced since prior exam   earlier same day. The tip of the endotracheal tube is at the inferior   margin of the clavicles.    No other change. Nasogastric tube tip in stomach. Lung infiltrates. No   pneumothorax.    --- End of Report ---       Interval Events:  Patient seen and examined at bedside. Extubated in AM. Breathing spontaneously  Denies SOB or CP      Allergies    No Known Allergies    Intolerances        Vital Signs Last 24 Hrs  T(C): 36.6 (08 Sep 2022 05:01), Max: 38.2 (07 Sep 2022 17:25)  T(F): 97.9 (08 Sep 2022 05:01), Max: 100.7 (07 Sep 2022 17:25)  HR: 61 (08 Sep 2022 06:00) (61 - 104)  BP: 97/52 (08 Sep 2022 06:00) (84/49 - 121/62)  BP(mean): 68 (08 Sep 2022 06:00) (62 - 85)  RR: 15 (08 Sep 2022 07:00) (15 - 15)  SpO2: 100% (08 Sep 2022 06:00) (92% - 100%)    Parameters below as of 08 Sep 2022 07:00  Patient On (Oxygen Delivery Method): ventilator    O2 Concentration (%): 40    09-06 @ 07:01  -  09-07 @ 07:00  --------------------------------------------------------  IN: 1426.8 mL / OUT: 1575 mL / NET: -148.2 mL    09-07 @ 07:01  -  09-08 @ 06:55  --------------------------------------------------------  IN: 1760 mL / OUT: 1875 mL / NET: -115 mL      09-06 @ 07:01  -  09-07 @ 07:00  --------------------------------------------------------  IN: 1426.8 mL / OUT: 1575 mL / NET: -148.2 mL    09-07 @ 07:01  -  09-08 @ 06:55  --------------------------------------------------------  IN: 1760 mL / OUT: 1875 mL / NET: -115 mL        Physical Exam:   General: Well appearing resting comfortably in bed in no acute distress, extubated  Neuro: AOx3, 5/5 strength extremities bilaterally  HEENT: Normocephalic, atraumatic, trachea midline, no JVD. incisions intranasally and intraorally, dried blood to nose and mouth, but no active bleeding. Ice packs lpaced on face, both cheeks  Heart: Regular S1/S2, no murmurs rubs or gallops   Lungs: Unlabored breathing; Decreased breath sounds bilaterally L>R, no wheezing, rales, rhonchi, or stridor noted. Face tent 02  Abdomen: Soft, non-distended, normoactive bowel sounds throughout, no tenderness to palpation in all 4 quadrants   Upper Extremities: No edema, freely mobile bilaterally   Lower Extremities: No edema, SCDs in place, feet warm bilaterally   Skin: Warm, non-diaphoretic throughout   : uretheral catheter removed  Lines/tubes: x3 IV lines, 1 PICC line (9/6), x1 scalp JOLEEN  Psych: accusatory of MD and nurse of treating her with insensitivity and uncooperative wearing oxygen; poor insight into events      LABS:  ABG - ( 08 Sep 2022 08:03 )  pH, Arterial: 7.51  pH, Blood: x     /  pCO2: 34    /  pO2: 195   / HCO3: 27    / Base Excess: 4.3   /  SaO2: 99.1        CBC Full  -  ( 08 Sep 2022 05:33 )  WBC Count : 14.22 K/uL  RBC Count : 2.88 M/uL  Hemoglobin : 8.9 g/dL  Hematocrit : 26.9 %  Platelet Count - Automated : 252 K/uL  Mean Cell Volume : 93.4 fl  Mean Cell Hemoglobin : 30.9 pg  Mean Cell Hemoglobin Concentration : 33.1 gm/dL  Auto Neutrophil # : x  Auto Lymphocyte # : x  Auto Monocyte # : x  Auto Eosinophil # : x  Auto Basophil # : x  Auto Neutrophil % : x  Auto Lymphocyte % : x  Auto Monocyte % : x  Auto Eosinophil % : x  Auto Basophil % : x    09-08    134<L>  |  99  |  10  ----------------------------<  142<H>  4.1   |  26  |  0.48<L>    Ca    7.5<L>      08 Sep 2022 05:33  Phos  1.9     09-08  Mg     2.8     09-08    TPro  x   /  Alb  3.2<L>  /  TBili  x   /  DBili  x   /  AST  x   /  ALT  x   /  AlkPhos  x   09-07    PT/INR - ( 06 Sep 2022 17:52 )   PT: 13.0 sec;   INR: 1.09          PTT - ( 06 Sep 2022 17:52 )  PTT:20.9 sec    RADIOLOGY & ADDITIONAL STUDIES (The following images were personally reviewed):  < from: Xray Chest 1 View-PORTABLE IMMEDIATE (Xray Chest 1 View-PORTABLE IMMEDIATE .) (09.07.22 @ 09:25) >  ACC: [id number] EXAM:  XR CHEST PORTABLE IMMED 1V                          PROCEDURE DATE:  09/07/2022          INTERPRETATION:  Portable chest    HISTORY: Endotracheal tube adjustment    IMPRESSION:    The endotracheal tube has been minimally advanced since prior exam   earlier same day. The tip of the endotracheal tube is at the inferior   margin of the clavicles.    No other change. Nasogastric tube tip in stomach. Lung infiltrates. No   pneumothorax.    --- End of Report ---

## 2022-09-08 NOTE — PROGRESS NOTE ADULT - ASSESSMENT
A/P:     Patient is a 31 y/o F s/p FFS 9/6 c/b reintubation in PACU for low saturation and increased work of breathing, remains in ICU:     - Recs per ICU, appreciate care and management of vent. Wean vent and extubate as tolerated - patient now off Precedex, plan to wean off propofol this AM.   - Clear/full diet once extubated  - Continue Decadron for swelling   - Pain medication  - SCDs   - HOB elevation   - Will follow     Plastic Surgery

## 2022-09-09 ENCOUNTER — TRANSCRIPTION ENCOUNTER (OUTPATIENT)
Age: 32
End: 2022-09-09

## 2022-09-09 VITALS — OXYGEN SATURATION: 97 % | HEART RATE: 89 BPM | SYSTOLIC BLOOD PRESSURE: 145 MMHG | DIASTOLIC BLOOD PRESSURE: 71 MMHG

## 2022-09-09 LAB
ANION GAP SERPL CALC-SCNC: 8 MMOL/L — SIGNIFICANT CHANGE UP (ref 5–17)
BUN SERPL-MCNC: 13 MG/DL — SIGNIFICANT CHANGE UP (ref 7–23)
CALCIUM SERPL-MCNC: 8.3 MG/DL — LOW (ref 8.4–10.5)
CHLORIDE SERPL-SCNC: 107 MMOL/L — SIGNIFICANT CHANGE UP (ref 96–108)
CO2 SERPL-SCNC: 23 MMOL/L — SIGNIFICANT CHANGE UP (ref 22–31)
CREAT SERPL-MCNC: 0.51 MG/DL — SIGNIFICANT CHANGE UP (ref 0.5–1.3)
CULTURE RESULTS: NO GROWTH — SIGNIFICANT CHANGE UP
EGFR: 126 ML/MIN/1.73M2 — SIGNIFICANT CHANGE UP
GLUCOSE SERPL-MCNC: 106 MG/DL — HIGH (ref 70–99)
HCT VFR BLD CALC: 28.1 % — LOW (ref 34.5–45)
HGB BLD-MCNC: 9.1 G/DL — LOW (ref 11.5–15.5)
MAGNESIUM SERPL-MCNC: 2 MG/DL — SIGNIFICANT CHANGE UP (ref 1.6–2.6)
MCHC RBC-ENTMCNC: 30.7 PG — SIGNIFICANT CHANGE UP (ref 27–34)
MCHC RBC-ENTMCNC: 32.4 GM/DL — SIGNIFICANT CHANGE UP (ref 32–36)
MCV RBC AUTO: 94.9 FL — SIGNIFICANT CHANGE UP (ref 80–100)
NRBC # BLD: 0 /100 WBCS — SIGNIFICANT CHANGE UP (ref 0–0)
PHOSPHATE SERPL-MCNC: 1.8 MG/DL — LOW (ref 2.5–4.5)
PLATELET # BLD AUTO: 255 K/UL — SIGNIFICANT CHANGE UP (ref 150–400)
POTASSIUM SERPL-MCNC: 3.6 MMOL/L — SIGNIFICANT CHANGE UP (ref 3.5–5.3)
POTASSIUM SERPL-SCNC: 3.6 MMOL/L — SIGNIFICANT CHANGE UP (ref 3.5–5.3)
RBC # BLD: 2.96 M/UL — LOW (ref 3.8–5.2)
RBC # FLD: 13.5 % — SIGNIFICANT CHANGE UP (ref 10.3–14.5)
SODIUM SERPL-SCNC: 138 MMOL/L — SIGNIFICANT CHANGE UP (ref 135–145)
SPECIMEN SOURCE: SIGNIFICANT CHANGE UP
WBC # BLD: 14.86 K/UL — HIGH (ref 3.8–10.5)
WBC # FLD AUTO: 14.86 K/UL — HIGH (ref 3.8–10.5)

## 2022-09-09 PROCEDURE — 71045 X-RAY EXAM CHEST 1 VIEW: CPT

## 2022-09-09 PROCEDURE — 97161 PT EVAL LOW COMPLEX 20 MIN: CPT

## 2022-09-09 PROCEDURE — 82962 GLUCOSE BLOOD TEST: CPT

## 2022-09-09 PROCEDURE — 71045 X-RAY EXAM CHEST 1 VIEW: CPT | Mod: 26

## 2022-09-09 PROCEDURE — 36415 COLL VENOUS BLD VENIPUNCTURE: CPT

## 2022-09-09 PROCEDURE — 83735 ASSAY OF MAGNESIUM: CPT

## 2022-09-09 PROCEDURE — 94003 VENT MGMT INPAT SUBQ DAY: CPT

## 2022-09-09 PROCEDURE — 87070 CULTURE OTHR SPECIMN AEROBIC: CPT

## 2022-09-09 PROCEDURE — 85730 THROMBOPLASTIN TIME PARTIAL: CPT

## 2022-09-09 PROCEDURE — 85027 COMPLETE CBC AUTOMATED: CPT

## 2022-09-09 PROCEDURE — 80048 BASIC METABOLIC PNL TOTAL CA: CPT

## 2022-09-09 PROCEDURE — C9143: CPT

## 2022-09-09 PROCEDURE — 85610 PROTHROMBIN TIME: CPT

## 2022-09-09 PROCEDURE — 82040 ASSAY OF SERUM ALBUMIN: CPT

## 2022-09-09 PROCEDURE — C1889: CPT

## 2022-09-09 PROCEDURE — 82803 BLOOD GASES ANY COMBINATION: CPT

## 2022-09-09 PROCEDURE — 84100 ASSAY OF PHOSPHORUS: CPT

## 2022-09-09 PROCEDURE — C1713: CPT

## 2022-09-09 PROCEDURE — 94002 VENT MGMT INPAT INIT DAY: CPT

## 2022-09-09 RX ORDER — POTASSIUM CHLORIDE 20 MEQ
10 PACKET (EA) ORAL
Refills: 0 | Status: DISCONTINUED | OUTPATIENT
Start: 2022-09-09 | End: 2022-09-09

## 2022-09-09 RX ORDER — CHLORHEXIDINE GLUCONATE 213 G/1000ML
15 SOLUTION TOPICAL
Qty: 300 | Refills: 0
Start: 2022-09-09 | End: 2022-09-18

## 2022-09-09 RX ORDER — POTASSIUM CHLORIDE 20 MEQ
20 PACKET (EA) ORAL ONCE
Refills: 0 | Status: COMPLETED | OUTPATIENT
Start: 2022-09-09 | End: 2022-09-09

## 2022-09-09 RX ORDER — GABAPENTIN 400 MG/1
300 CAPSULE ORAL
Qty: 0 | Refills: 0 | DISCHARGE

## 2022-09-09 RX ORDER — CHLORHEXIDINE GLUCONATE 213 G/1000ML
15 SOLUTION TOPICAL
Qty: 0 | Refills: 0 | DISCHARGE
Start: 2022-09-09

## 2022-09-09 RX ORDER — OXYCODONE HYDROCHLORIDE 5 MG/1
1 TABLET ORAL
Qty: 0 | Refills: 0 | DISCHARGE

## 2022-09-09 RX ORDER — IBUPROFEN 200 MG
1 TABLET ORAL
Qty: 0 | Refills: 0 | DISCHARGE

## 2022-09-09 RX ADMIN — Medication 100 MILLIGRAM(S): at 00:27

## 2022-09-09 RX ADMIN — CHLORHEXIDINE GLUCONATE 15 MILLILITER(S): 213 SOLUTION TOPICAL at 05:49

## 2022-09-09 RX ADMIN — Medication 100 MILLIGRAM(S): at 09:01

## 2022-09-09 RX ADMIN — Medication 650 MILLIGRAM(S): at 14:29

## 2022-09-09 RX ADMIN — CHLORHEXIDINE GLUCONATE 1 APPLICATION(S): 213 SOLUTION TOPICAL at 05:49

## 2022-09-09 RX ADMIN — HEPARIN SODIUM 5000 UNIT(S): 5000 INJECTION INTRAVENOUS; SUBCUTANEOUS at 05:49

## 2022-09-09 RX ADMIN — Medication 100 MILLIEQUIVALENT(S): at 09:01

## 2022-09-09 NOTE — DISCHARGE NOTE PROVIDER - NSDCFUSCHEDAPPT_GEN_ALL_CORE_FT
Kyree Skinner  [facility]  [address]  Scheduled Appointment: 09/16/2022    Armando Palmer  [facility]  [facility] [address]  Scheduled Appointment: 10/12/2022

## 2022-09-09 NOTE — DISCHARGE NOTE PROVIDER - NSDCCPTREATMENT_GEN_ALL_CORE_FT
PRINCIPAL PROCEDURE  Procedure: Contouring, forehead, anterior frontal sinus wall, for reduction and setback  Findings and Treatment:       SECONDARY PROCEDURE  Procedure: Complete rhinoplasty  Findings and Treatment:     Procedure: Platysmaplasty  Findings and Treatment:     Procedure: Excision, submental fat pad excessive skin and subcutaneous tissue  Findings and Treatment:     Procedure: Fat grafting  Findings and Treatment:     Procedure: Contouring, forehead, anterior frontal sinus wall, for reduction and setback  Findings and Treatment:     Procedure: Genioplasty  Findings and Treatment:

## 2022-09-09 NOTE — DISCHARGE NOTE PROVIDER - NSDCMRMEDTOKEN_GEN_ALL_CORE_FT
estradiol valerate 20 mg/mL intramuscular solution: intramuscular once a month  spironolactone 100 mg oral tablet: orally 2 times a week   estradiol valerate 20 mg/mL intramuscular solution: intramuscular once a month  gabapentin 300 mg oral capsule: 300 milligram(s) orally 2 times a day  ibuprofen 600 mg oral tablet: 1 tab(s) orally every 6 hours  oxyCODONE 5 mg oral tablet: 1 tab(s) orally every 8 hours, As Needed  spironolactone 100 mg oral tablet: orally 2 times a week   chlorhexidine 0.12% mucous membrane liquid: 15 milliliter(s) mucous membrane 2 times a day  estradiol valerate 20 mg/mL intramuscular solution: intramuscular once a month  gabapentin 300 mg oral capsule: 300 milligram(s) orally 2 times a day  ibuprofen 600 mg oral tablet: 1 tab(s) orally every 6 hours  oxyCODONE 5 mg oral tablet: 1 tab(s) orally every 8 hours, As Needed  spironolactone 100 mg oral tablet: orally 2 times a week

## 2022-09-09 NOTE — DISCHARGE NOTE PROVIDER - NSDCFUADDINST_GEN_ALL_CORE_FT
Please follow up with Dr. Skinner within 1 week of discharge from the hospital. You may call [phone] to schedule an appointment.     Medications:  •A prescription for pain medication will be e-prescribed to your pharmacy.  Please take this as needed for severe pain.  •Do not drive while taking the prescribed pain medication.  •Do not take pain medication on an empty stomach, this may make you nauseous.  •Constipation is not uncommon when taking prescription pain medication.  You may take an OTC stool softener like Dulcolax or Sennakot to help with this.  •You may take over the counter pain medication such as Tylenol (acetaminophen) or Advil (ibuprofen) for pain.  •Please use Peridex rinse- swish and spit twice daily for 7 days.  Activity:  •No bending or heavy lifting.  Keep your head above the level of your heart.  At your first post-op visit we will assess how you are doing and will adjust the restrictions as needed.  •Sleep elevated on at least 2 pillows  Diet:  •	Intraoral incisions:  Clear liquid diet for first 48 hours then may advance to full liquid diet for 7 days.  Then advance to soft diet.  Avoid overly hot, cold, spicy, or acidic foods.  •	Do not drink alcohol in the immediate postoperative period and while taking prescription pain medication.  Wound Care:  • Gently brush teeth and keep mouth clean after eating.  At your first post-op visit we will assess the wound and instruct you of any care needed.  •You may shower.  Let soap and water run over the incisions and pat dry.  •You may gently apply ice pack, or frozen peas to the nose and eye regions.  Apply this for the first 48-72 hours.  20 minutes on, 20 minutes off.  •If you were given a facial/chin garment, please wear this 24/7 until your first postop visit

## 2022-09-09 NOTE — DISCHARGE NOTE PROVIDER - CARE PROVIDER_API CALL
Kyree Skinner (MD)  Plastic Surgery; Surgery  [address] [facility], [address]  [place] [address]  Phone: [phone]  Fax: [phone]  Established Patient  Follow Up Time:

## 2022-09-09 NOTE — DISCHARGE NOTE NURSING/CASE MANAGEMENT/SOCIAL WORK - NSCORESITESY/N_GEN_A_CORE_RD
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 Diaz who presented after episode of sudden onset R-sided weakness/tremors and aphasia for about 5 minutes, resolved prior to EMS arrival, concerning for focal seizure, then with second episode while in the ED. MRI brain w/wo concerning for scalp and epidural infection.    Now s/p L crani revision for wound washout.     - Admitted to [facility] with [facility] neurochecks.  - all labs and diagnostics reviewed          - CTA 8/21: largely stable from prior with epidural fluid collection and extracranial fluid collection stable in size; stable postsurgical changes of L PCOM/anterior choroidal aneurysm clipping, stable small left MCA bifurcation aneurysm          - MRI brain w wo 8/21: peripheral rim enhancement of subdural and scalp complex fluid collections, concerning for infection; no evidence of enhancement of brain parenchyma   - spot EEG 8/23: mild regional cortical or subcortical dysfunction in the left temporal region with no electrographic seizures or indications of seizure tendency.   - CTH 8/28: Residual mixed density collection overlies the soft tissues as well as within the extra-axial space  underlying the craniotomy likely represents postoperative gas fluid and hemorrhage with residual infection not  Excluded. No new intracranial findings.    - CTH 8/29: worsening epidural and subcutaneous fluid collection with local mass effect on L frontal lobe      - CTH 8/30: extracranial collection gone, epidural collection remains with some mild mass effect     - Infectious workup:          - afebrile, no leukocytosis          - blood cx 8/21 NGTD          - ESR 30-->46, CRP 28.5-->26          - Subcutaneous scalp fluid collection tapped 8/22, sent for Cx's - NGTD          - ID consulted, recommend Vanc/Cefepime, continue to follow Cx's  - Seizures: Episodes concerning for focal  seizures with R-sided tremors and weakness. Not on AEDs prior to admission.          - Keppra loaded on admission, continue 1g bid          - Likely provoked focal seizure activity due to cranial fluid collection; pt also with very elevated BG on admission, uncontrolled hyperglycemia may have been contributory to provoked seizure          - spot EEG 8/23 with left temporal subcortical dysfunction, negative for electrographic seizures  - New Onset T2DM: No h/o DM. Hyperglycemic on admission. HbA1C 8.3. Endocrinology consulted. Goal -180.     - Levemir 30 untis daily First dose this Am. 0.7 u/kg/d dosing.      - Novolog 10 units TIDWM. Basal/Prandial physiologic matching.       - Moderate Dose SQ Insulin Correction Scale.     - BG Monitoring AC/HS.           - Bedside nurse to instruct on insulin pen use and blood sugar monitor. Have patient administer own injections after education completed supervised by nurse. Have patient watch insulin educatoin video's via i-pad if available on unit.  - HTN: SBP <160. Controlled on current regimen. Continue home meds.  - Hypokalemia, Hypomagnesemia: Chronic, on home supplements daily.          - Resume home potassium and Mg supplements          - Replete PRN, daily labs  - regular diet  - HAIM/SCD/SQH  - PT/OT/OOB  - HV x1; abx while in place  - stable from nsgy perspective to TTF when deemed appropriate by NCC  Dispo: ongoing   No

## 2022-09-09 NOTE — DISCHARGE NOTE NURSING/CASE MANAGEMENT/SOCIAL WORK - PATIENT PORTAL LINK FT
You can access the FollowMyHealth Patient Portal offered by [facility] by registering at the following website: [email or web address]. By joining [employer]’s FollowMyHealth portal, you will also be able to view your health information using other applications (apps) compatible with our system.

## 2022-09-09 NOTE — PROGRESS NOTE ADULT - SUBJECTIVE AND OBJECTIVE BOX
Interval Events:  Patient seen and examined at bedside. States pain is controlled and has appetite has not ambulated yet.  Denies SOB CP, N/V, fevers/chills      Allergies    No Known Allergies    Intolerances      Physical Exam:   General: Well appearing resting comfortably in bed in no acute distress, extubated  Neuro: AOx3, 5/5 strength extremities bilaterally  HEENT: Normocephalic, atraumatic, trachea midline, no JVD. incisions intranasally and intraorally, dried blood to nose and mouth, but no active bleeding. Ice packs lpaced on face, both cheeks  Heart: Regular S1/S2, no murmurs rubs or gallops   Lungs: Unlabored breathing; Decreased breath sounds bilaterally L>R, no wheezing, rales, rhonchi, or stridor noted. Face tent 02  Abdomen: Soft, non-distended, normoactive bowel sounds throughout, no tenderness to palpation in all 4 quadrants   Upper Extremities: No edema, freely mobile bilaterally   Lower Extremities: No edema, SCDs in place, feet warm bilaterally   Skin: Warm, non-diaphoretic throughout   : uretheral catheter removed  Lines/tubes: x3 IV lines, 1 PICC line (9/6), x1 scalp JOLEEN  Psych: accusatory of MD and nurse of treating her with insensitivity and uncooperative wearing oxygen; poor insight into events    P       Interval Events:  Patient seen and examined at bedside. States pain is controlled and has appetite, moved from bed to chair. Has not seen PT yet  Denies SOB CP, N/V, fevers/chills      Allergies    No Known Allergies    Intolerances      Physical Exam:   General: Well appearing resting comfortably in bed in no acute distress, extubated  Neuro: AOx3, 5/5 strength extremities bilaterally  HEENT: Normocephalic, atraumatic, trachea midline, no JVD. incisions intranasally and intraorally, dried blood to nose and mouth, but no active bleeding.  Heart: Regular S1/S2, no murmurs rubs or gallops   Lungs: Unlabored breathing; mild becreased breath sounds bilaterally L>R, improved from yesterday, no wheezing, rales, rhonchi, or stridor noted. Room air  Abdomen: Soft, non-distended, normoactive bowel sounds throughout, no tenderness to palpation in all 4 quadrants   Upper Extremities: No edema, freely mobile bilaterally   Lower Extremities: No edema, SCDs in place, feet warm bilaterally   Skin: Warm, non-diaphoretic throughout   : uretheral catheter removed  Lines/tubes: x3 IV lines, 1 PICC line (), x1 scalp JOLEEN removed ()  Psych: accusatory of MD and nurse of treating her with insensitivity and uncooperative wearing oxygen (); poor insight into events        MEDICATIONS  (STANDING):  chlorhexidine 0.12% Liquid 15 milliLiter(s) Oral Mucosa two times a day  chlorhexidine 2% Cloths 1 Application(s) Topical <User Schedule>  heparin   Injectable 5000 Unit(s) SubCutaneous every 8 hours  influenza   Vaccine 0.5 milliLiter(s) IntraMuscular once  melatonin 5 milliGRAM(s) Oral at bedtime  sodium phosphate IVPB 30 milliMole(s) IV Intermittent once    MEDICATIONS  (PRN):  acetaminophen     Tablet .. 325 milliGRAM(s) Oral every 4 hours PRN Mild Pain (1 - 3)  acetaminophen     Tablet .. 650 milliGRAM(s) Oral every 6 hours PRN Moderate Pain (4 - 6), Severe Pain (7 - 10)  ondansetron Injectable 4 milliGRAM(s) IV Push every 6 hours PRN Nausea and/or Vomiting        Labs:                         9.1    14.86 )-----------( 255      ( 09 Sep 2022 05:22 )             28.1     09-09    138  |  107  |  13  ----------------------------<  106<H>  3.6   |  23  |  0.51    Ca    8.3<L>      09 Sep 2022 05:22  Phos  1.8       Mg     2.0           CAPILLARY BLOOD GLUCOSE      Culture - Sputum (collected 07 Sep 2022 00:12)  Source: .Sputum Sputum  Gram Stain (07 Sep 2022 16:46):    No epithelial cells seen    Rare to few White blood cells    No organisms seen  Final Report (09 Sep 2022 10:45):    No growth        Vital Signs:   Vital Signs Last 24 Hrs  T(C): 37.1 (09 Sep 2022 09:00), Max: 37.1 (09 Sep 2022 04:33)  T(F): 98.7 (09 Sep 2022 09:00), Max: 98.7 (09 Sep 2022 04:33)  HR: 86 (09 Sep 2022 11:00) (77 - 100)  BP: 121/69 (09 Sep 2022 11:00) (91/63 - 127/63)  BP(mean): 90 (09 Sep 2022 11:00) (67 - 90)  RR: 18 (09 Sep 2022 11:00) (13 - 18)  SpO2: 95% (09 Sep 2022 11:00) (90% - 98%)    Parameters below as of 09 Sep 2022 11:00  Patient On (Oxygen Delivery Method): room air          Input/Output:   I&O's Detail    08 Sep 2022 07:01  -  09 Sep 2022 07:00  --------------------------------------------------------  IN:    Dexmedetomidine: 11.2 mL    IV PiggyBack: 498 mL    IV PiggyBack: 200 mL    Oral Fluid: 767 mL    Propofol: 22 mL    Sodium Chloride 0.9% Bolus: 1500 mL  Total IN: 2998.2 mL    OUT:    Bulb (mL): 0 mL    FentaNYL: 0 mL    Indwelling Catheter - Urethral (mL): 125 mL    Norepinephrine: 0 mL    Voided (mL): 1750 mL  Total OUT: 1875 mL    Total NET: 1123.2 mL      09 Sep 2022 07:01  -  09 Sep 2022 11:57  --------------------------------------------------------  IN:    IV PiggyBack: 255 mL    IV PiggyBack: 100 mL    IV PiggyBack: 50 mL    Oral Fluid: 100 mL  Total IN: 505 mL    OUT:    Voided (mL): 700 mL  Total OUT: 700 mL    Total NET: -195 mL        Daily     Daily Weight in k.5 (09 Sep 2022 06:00)

## 2022-09-09 NOTE — PHYSICAL THERAPY INITIAL EVALUATION ADULT - PERTINENT HX OF CURRENT PROBLEM, REHAB EVAL
32 year old female admitted for elective facial feminization surgery, extubated post-operatively, but had copious bloody secretions from nose and mouth, then became hypoxic while she was supine, suspect aspiration pneumonitis. transferred to SICU for hypoxic respiratory failure likely secondary to aspiration pneumonitis with subsequent ARDS, intubated (9/6-9/8). Now satting well on room air.

## 2022-09-09 NOTE — DISCHARGE NOTE PROVIDER - CARE PROVIDERS DIRECT ADDRESSES
[email or web address]

## 2022-09-09 NOTE — PROGRESS NOTE ADULT - ASSESSMENT
32F AMAB POD3 from FFS c/b hypoxia with reintubation post op now extubated doing well.    -elevate head of bed  -pain control oxy prn, standing tylenol  -liquid diet  -abx til dc  -anticipate dc home today  -> home with gabapentin 300 BID, peridex TID w/ meals, oxy prn    Miguel Mendenhall MD PGY6

## 2022-09-09 NOTE — PHYSICAL THERAPY INITIAL EVALUATION ADULT - DID THE PATIENT HAVE SURGERY?
Liposuction to abdomen with fat grafting to face, submental fat excision and scar revision. Frontal sinus set back and supraorbital rim contour, genioplasty, bilateral mandible angle reduction, submental fat excision and platysmaplasty/yes

## 2022-09-09 NOTE — PROGRESS NOTE ADULT - SUBJECTIVE AND OBJECTIVE BOX
S: pt seen and examined doing well. extubated yest. tolerating liquid diet.    O:  T(C): 37.1 (09-09-22 @ 04:33), Max: 37.1 (09-08-22 @ 10:00)  HR: 78 (09-09-22 @ 07:00) (58 - 100)  BP: 119/67 (09-09-22 @ 07:00) (84/51 - 127/63)  RR: 18 (09-09-22 @ 07:00) (16 - 18)  SpO2: 98% (09-09-22 @ 07:00) (90% - 100%)  Wt(kg): --  09-08 @ 07:01  -  09-09 @ 07:00  --------------------------------------------------------    Gen NAD  HEENT: drain and dressing taken down, everything soft incisions cdi

## 2022-09-09 NOTE — DISCHARGE NOTE PROVIDER - HOSPITAL COURSE
31 yo transgender female underwent facial feminization surgery (9/6). Patient tolerated the procedure well, course complicated by copious blood secretions from nose and mouth with suspected patient aspiration. Patient became hypoxic requiring respiratory support with reintubation night of procedure. Patients clinical course improved and was extubated 9/8. She has been off ventilation and saturating well on room air. The patient was evaluated and deemed in stable condition to be discharged to home. Follow up in one week with Dr. Skinner in the office.   33 yo transgender female underwent facial feminization surgery (9/6). Patient tolerated the procedure well, following extubation the patient was maintained with lower saturations in the low 80s with copious secretions. In PACU patient was still maintaining SpO2 in mid to low 80s and not compliant with high flow nasal cannula or face tent for oxygen support, it was determined by anesthesia to be reintubated for respiratory support and taken to the SICU for care with suspected aspiration vs. ARDs and pulmonary edema.. Patients clinical course improved and was extubated 9/8. She has been off ventilation and saturating well on room air. The patient was evaluated and deemed in stable condition to be discharged to home. Follow up in one week with Dr. Skinner in the office. At time of discharge all JOLEEN drains removed and dressings changed.

## 2022-09-09 NOTE — PROGRESS NOTE ADULT - ASSESSMENT
32F undergoing male to female transformation, hx facial feminization, b/l breast implants, admitted for elective facial feminization surgery, extubated post-operatively, but had copious bloody secretions from nose and mouth, then became hypoxic while she was supine, suspect aspiration pneumonitis. transferred to SICU for hypoxic respiratory failure likely secondary to aspiration pneumonitis with subsequent ARDS. Extubated this AM, AAOB, breathing w/ face tent, AOx3.    9/8 events: extubated, dc decadron, Orthostatic- given 1.5L bolus of NS x1. Remove frank. Advance diet to CLD    NEURO: tylenol PRN pain PO  CV: levophed. 1.5L bolus of NS x1 orthostatic  PULM: Extubated (9/8 AM),  02 face tent  GI/FEN: CLD, OGT LIWS, protonix.   PLASTICS: DC decadron  : farnk d/piyush (9/8)  ENDO: ISS  ID: andrew-op ppx: ancef (9/6--), pending cxs   PPX: SCDs, SQH, goal AOOB (9/8)  LINES: x3 PIVs, oscar (9/6--)   WOUNDS/DRAINS: intranasal and intraoral wounds. drain under scalp (+JPx1). Ice collar facial swelling    32F undergoing male to female transformation, hx facial feminization, b/l breast implants, admitted for elective facial feminization surgery, extubated post-operatively, but had copious bloody secretions from nose and mouth, then became hypoxic while she was supine, suspect aspiration pneumonitis. transferred to SICU for hypoxic respiratory failure likely secondary to aspiration pneumonitis with subsequent ARDS, intubated (9/6-9/8). Now satting well on room air    NEURO: tylenol PRN pain PO, melatonin  CV: HD stable  PULM: Extubated (9/8 AM), tolerating RA. Encourage incentive spirometry  GI/FEN: regular diet  : frank d/piyush (9/8), TOV passed (850mL 9/8 afternoon)  ENDO:   ID: andrew-op ppx: ancef (9/6-9/9), cxs NTD  PPX: SCDs, SQH  LINES: x3 PIVs, oscar (9/6-9/8) removed   WOUNDS/DRAINS: intranasal and intraoral wounds. drain under scalp removed   PT: Pending recs, ambulated from bed to chair

## 2022-09-13 RX ORDER — OXYCODONE 5 MG/1
5 TABLET ORAL
Qty: 10 | Refills: 0 | Status: DISCONTINUED | COMMUNITY
Start: 2022-09-12 | End: 2022-09-13

## 2022-09-13 RX ORDER — CHLORHEXIDINE GLUCONATE, 0.12% ORAL RINSE 1.2 MG/ML
0.12 SOLUTION DENTAL
Qty: 1 | Refills: 0 | Status: DISCONTINUED | COMMUNITY
Start: 2022-09-12 | End: 2022-09-13

## 2022-09-13 RX ORDER — IBUPROFEN 600 MG/1
600 TABLET, FILM COATED ORAL EVERY 6 HOURS
Qty: 28 | Refills: 0 | Status: DISCONTINUED | COMMUNITY
Start: 2022-09-12 | End: 2022-09-13

## 2022-09-13 RX ORDER — GABAPENTIN 300 MG/1
300 CAPSULE ORAL
Qty: 10 | Refills: 0 | Status: DISCONTINUED | COMMUNITY
Start: 2022-09-12 | End: 2022-09-13

## 2022-09-15 DIAGNOSIS — J96.91 RESPIRATORY FAILURE, UNSPECIFIED WITH HYPOXIA: ICD-10-CM

## 2022-09-15 DIAGNOSIS — F64.0 TRANSSEXUALISM: ICD-10-CM

## 2022-09-15 DIAGNOSIS — J69.0 PNEUMONITIS DUE TO INHALATION OF FOOD AND VOMIT: ICD-10-CM

## 2022-09-16 ENCOUNTER — APPOINTMENT (OUTPATIENT)
Dept: PLASTIC SURGERY | Facility: CLINIC | Age: 32
End: 2022-09-16

## 2022-09-16 VITALS
HEIGHT: 72 IN | OXYGEN SATURATION: 100 % | DIASTOLIC BLOOD PRESSURE: 78 MMHG | TEMPERATURE: 98.4 F | SYSTOLIC BLOOD PRESSURE: 123 MMHG | WEIGHT: 200 LBS | HEART RATE: 80 BPM | BODY MASS INDEX: 27.09 KG/M2

## 2022-09-16 PROBLEM — F64.9 GENDER IDENTITY DISORDER, UNSPECIFIED: Chronic | Status: ACTIVE | Noted: 2022-09-02

## 2022-09-21 NOTE — HISTORY OF PRESENT ILLNESS
[FreeTextEntry1] : YAMIL Cantrell" is a 32 year old transgender female patient that presents to the office today for a post operative evaluation s/p Facial feminization surgery with (Frontal sinus setback, Bilateral orbital reconstruction, Bilateral browlift and hairline lowering, Bilateral supraorbital contouring, Fat grafting from abdomen to malar temporal nasolabial fold, Osseous genioplasty narrowing shortening advancement, Mandible reconstruction with prosthetic material, Mandible reconstruction with autologous tissue, Mandible angle reduction resection, Upper lip augmentation with fascial graft, Rhinoplasty revision, Submucous resection of the septum, Cartilage grafting with septal cartilage and MTF cartilage for bilateral  grafts, dorsal onlay graft, columellar strut and tip graft, Bilateral tarsorrhaphy, Submental fat excision, Platysmaplasty neck tightening, Tracheal shave/Chondrolaryngoplasty on 9/6/2022. Patient is happy with the results. Patient denies having any significant concerns or complaints.\par \par

## 2022-09-21 NOTE — PHYSICAL EXAM
[de-identified] : Alert, calm, cooperative.\par  [de-identified] : Coronal incision is well approximated with no signs of wound dehiscence or fluctuance. Moderate edema and mild ecchymosis noted.\par  [de-identified] : Nasal, oral and lip incisions are well approximated with no signs of wound dehiscence or fluctuance. Moderate edema and mild ecchymosis noted.\par  [de-identified] : Tracheal shave incision is well approximated with no signs of wound dehiscence or fluctuance. Moderate edema and mild ecchymosis noted.\par  [de-identified] : Respirations even and unlabored.

## 2022-09-26 ENCOUNTER — APPOINTMENT (OUTPATIENT)
Dept: PLASTIC SURGERY | Facility: CLINIC | Age: 32
End: 2022-09-26

## 2022-09-26 PROCEDURE — 99024 POSTOP FOLLOW-UP VISIT: CPT

## 2022-09-27 NOTE — REASON FOR VISIT
[FreeTextEntry1] : DOS 09/06/22 OPERATION:Facial feminization surgery with:\par 1.  Frontal sinus setback.\par 2.  Bilateral orbital reconstruction.\par 3.  Bilateral browlift and hairline lowering.\par 4.  Bilateral supraorbital contouring.\par 5.  Fat grafting from abdomen to malar temporal nasolabial fold.\par 6.  Osseous genioplasty narrowing shortening advancement.\par 7.  Mandible reconstruction with prosthetic material.\par 8.  Mandible reconstruction with autologous tissue.\par 9.  Mandible angle reduction resection.\par 10.  Upper lip augmentation with fascial graft.\par 11.  Rhinoplasty revision.\par 12.  Submucous resection of the septum.\par 13.  Cartilage grafting with septal cartilage and MTF cartilage for bilateral  grafts, dorsal onlay graft, columellar strut and tip graft.\par 14.  Bilateral tarsorrhaphy.\par 15.  Submental fat excision.\par 16.  Platysmaplasty neck tightening.\par 17.  Tracheal shave/Chondrolaryngoplasty.

## 2022-09-27 NOTE — PHYSICAL EXAM
[de-identified] : Alert, calm, cooperative.\par  [de-identified] : Coronal incision with no signs of wound dehiscence or fluctuance. Mild edema noted but improving.\par  [de-identified] : Nasal, oral and lip incisions with no signs of wound dehiscence or fluctuance. Moderate edema noted but improving.\par  [de-identified] : Tracheal shave incision is well approximated with no signs of wound dehiscence or fluctuance. Moderate edema and mild ecchymosis noted.\par  [de-identified] : Respirations even and unlabored.

## 2022-10-01 NOTE — PHYSICAL EXAM
[de-identified] : Alert, calm, cooperative.\par  [de-identified] : + Prominent brow and lateral orbital rim type III. [de-identified] : + bulbous tip with poor tip support, wide nose, + prominent mandibular angle. Active masseter; + wide chin; +Hypoplastic cheeks; hypoplastic lips. [de-identified] : + Prominent tracheal bulge ("Sean's Apple"); +excess submental fat. [de-identified] : Respirations even and unlabored.

## 2022-10-01 NOTE — REASON FOR VISIT
[Follow-Up: _____] : a [unfilled] follow-up visit [FreeTextEntry1] : preop for FFS surgery schedule 09/01/22

## 2022-10-05 ENCOUNTER — APPOINTMENT (OUTPATIENT)
Dept: PLASTIC SURGERY | Facility: CLINIC | Age: 32
End: 2022-10-05

## 2022-10-17 ENCOUNTER — APPOINTMENT (OUTPATIENT)
Dept: PLASTIC SURGERY | Facility: CLINIC | Age: 32
End: 2022-10-17

## 2022-10-17 PROCEDURE — 99024 POSTOP FOLLOW-UP VISIT: CPT

## 2022-10-18 NOTE — HISTORY OF PRESENT ILLNESS
[FreeTextEntry1] : YAMIL Cantrell" is a 32 year old transgender female patient that presents to the office today for a post operative evaluation s/p Facial feminization surgery with (Frontal sinus setback, Bilateral orbital reconstruction, Bilateral browlift and hairline lowering, Bilateral supraorbital contouring, Fat grafting from abdomen to malar temporal nasolabial fold, Osseous genioplasty narrowing shortening advancement, Mandible reconstruction with prosthetic material, Mandible reconstruction with autologous tissue, Mandible angle reduction resection, Upper lip augmentation with fascial graft, Rhinoplasty revision, Submucous resection of the septum, Cartilage grafting with septal cartilage and MTF cartilage for bilateral  grafts, dorsal onlay graft, columellar strut and tip graft, Bilateral tarsorrhaphy, Submental fat excision, Platysmaplasty neck tightening, Tracheal shave/Chondrolaryngoplasty) on 9/6/2022. Patient is happy with the results. Patient denies having any significant concerns or complaints.\par \par

## 2022-10-18 NOTE — PHYSICAL EXAM
[de-identified] : Alert, calm, cooperative.\par  [de-identified] : Coronal incision with no signs of wound dehiscence or fluctuance. Mild edema noted but improving.\par  [de-identified] : Nasal, oral and lip incisions with no signs of wound dehiscence or fluctuance. Mild edema noted but improving.\par \par  [de-identified] : Tracheal shave incision with no signs of wound dehiscence or fluctuance. Mild edema noted but improving.\par \par  [de-identified] : Respirations even and unlabored.

## 2022-10-18 NOTE — REASON FOR VISIT
[Post Op: _________] : a [unfilled] post op visit [FreeTextEntry1] : DOS 09/06/22 OPERATION:Facial feminization surgery with:\par 1. Frontal sinus setback.\par 2. Bilateral orbital reconstruction.\par 3. Bilateral browlift and hairline lowering.\par 4. Bilateral supraorbital contouring.\par 5. Fat grafting from abdomen to malar temporal nasolabial fold.\par 6. Osseous genioplasty narrowing shortening advancement.\par 7. Mandible reconstruction with prosthetic material.\par 8. Mandible reconstruction with autologous tissue.\par 9. Mandible angle reduction resection.\par 10. Upper lip augmentation with fascial graft.\par 11. Rhinoplasty revision.\par 12. Submucous resection of the septum.\par 13. Cartilage grafting with septal cartilage and MTF cartilage for bilateral  grafts, dorsal onlay graft, columellar strut and tip graft.\par 14. Bilateral tarsorrhaphy.\par 15. Submental fat excision.\par 16. Platysmaplasty neck tightening.\par 17. Tracheal shave/Chondrolaryngoplasty. \par

## 2022-10-19 ENCOUNTER — RESULT REVIEW (OUTPATIENT)
Age: 32
End: 2022-10-19

## 2022-11-21 ENCOUNTER — APPOINTMENT (OUTPATIENT)
Dept: PLASTIC SURGERY | Facility: CLINIC | Age: 32
End: 2022-11-21

## 2022-11-21 NOTE — REASON FOR VISIT
[Post Op: _________] : a [unfilled] post op visit [FreeTextEntry1] : DOS 09/06/22 OPERATION:Facial feminization surgery with:\par 1. Frontal sinus setback.\par 2. Bilateral orbital reconstruction.\par 3. Bilateral browlift and hairline lowering.\par 4. Bilateral supraorbital contouring.\par 5. Fat grafting from abdomen to malar temporal nasolabial fold.\par 6. Osseous genioplasty narrowing shortening advancement.\par 7. Mandible reconstruction with prosthetic material.\par 8. Mandible reconstruction with autologous tissue.\par 9. Mandible angle reduction resection.\par 10. Upper lip augmentation with fascial graft.\par 11. Rhinoplasty revision.\par 12. Submucous resection of the septum.\par 13. Cartilage grafting with septal cartilage and MTF cartilage for bilateral  grafts, dorsal onlay graft, columellar strut and tip graft.\par 14. Bilateral tarsorrhaphy.\par 15. Submental fat excision.\par 16. Platysmaplasty neck tightening.\par 17. Tracheal shave/Chondrolaryngoplasty.

## 2023-01-23 ENCOUNTER — APPOINTMENT (OUTPATIENT)
Dept: PLASTIC SURGERY | Facility: CLINIC | Age: 33
End: 2023-01-23

## 2023-03-06 ENCOUNTER — APPOINTMENT (OUTPATIENT)
Dept: PLASTIC SURGERY | Facility: CLINIC | Age: 33
End: 2023-03-06
Payer: COMMERCIAL

## 2023-03-06 DIAGNOSIS — Z09 ENCOUNTER FOR FOLLOW-UP EXAMINATION AFTER COMPLETED TREATMENT FOR CONDITIONS OTHER THAN MALIGNANT NEOPLASM: ICD-10-CM

## 2023-03-06 PROCEDURE — 99072 ADDL SUPL MATRL&STAF TM PHE: CPT

## 2023-03-06 PROCEDURE — 99213 OFFICE O/P EST LOW 20 MIN: CPT

## 2023-03-19 PROBLEM — Z09 POSTOPERATIVE EXAMINATION: Status: ACTIVE | Noted: 2022-09-16

## 2023-03-19 NOTE — REASON FOR VISIT
[FreeTextEntry1] : DOS 09/06/22 OPERATION:Facial feminization surgery with: 1. Frontal sinus setback. 2. Bilateral orbital reconstruction. 3. Bilateral browlift and hairline lowering. 4. Bilateral supraorbital contouring. 5. Fat grafting from abdomen to malar temporal nasolabial fold. 6. Osseous genioplasty narrowing shortening advancement. 7. Mandible reconstruction with prosthetic material. 8. Mandible reconstruction with autologous tissue. 9. Mandible angle reduction resection. 10. Upper lip augmentation with fascial graft. 11. Rhinoplasty revision. 12. Submucous resection of the septum. 13. Cartilage grafting with septal cartilage and MTF cartilage for bilateral  grafts, dorsal onlay graft, columellar strut and tip graft. 14. Bilateral tarsorrhaphy. 15. Submental fat excision. 16. Platysmaplasty neck tightening. 17. Tracheal shave/Chondrolaryngoplasty.

## 2023-06-05 ENCOUNTER — APPOINTMENT (OUTPATIENT)
Dept: PLASTIC SURGERY | Facility: CLINIC | Age: 33
End: 2023-06-05
Payer: COMMERCIAL

## 2023-06-05 PROCEDURE — XXXXX: CPT | Mod: 1L

## 2023-07-17 ENCOUNTER — APPOINTMENT (OUTPATIENT)
Dept: PLASTIC SURGERY | Facility: CLINIC | Age: 33
End: 2023-07-17
Payer: COMMERCIAL

## 2023-07-17 PROCEDURE — XXXXX: CPT | Mod: 1L

## 2023-07-17 NOTE — REASON FOR VISIT
[FreeTextEntry1] :  Discuss possible revision - DOS 09/06/22 OPERATION:Facial feminization surgery with:\par 1. Frontal sinus setback.\par 2. Bilateral orbital reconstruction.\par 3. Bilateral browlift and hairline lowering.\par 4. Bilateral supraorbital contouring.\par 5. Fat grafting from abdomen to malar temporal nasolabial fold.\par 6. Osseous genioplasty narrowing shortening advancement.\par 7. Mandible reconstruction with prosthetic material.\par 8. Mandible reconstruction with autologous tissue.\par 9. Mandible angle reduction resection.\par 10. Upper lip augmentation with fascial graft.\par 11. Rhinoplasty revision.\par 12. Submucous resection of the septum.\par 13. Cartilage grafting with septal cartilage and MTF cartilage for bilateral  grafts, dorsal onlay graft, columellar strut and tip graft.\par 14. Bilateral tarsorrhaphy.\par 15. Submental fat excision.\par 16. Platysmaplasty neck tightening.\par 17. Tracheal shave/Chondrolaryngoplasty. \par

## 2023-08-10 ENCOUNTER — APPOINTMENT (OUTPATIENT)
Dept: PLASTIC SURGERY | Facility: CLINIC | Age: 33
End: 2023-08-10
Payer: COMMERCIAL

## 2023-08-10 PROCEDURE — 99213 OFFICE O/P EST LOW 20 MIN: CPT

## 2023-08-10 NOTE — HISTORY OF PRESENT ILLNESS
Patient:   MILO LU            MRN: [id number]            FIN: [id number]               Age:   29 years     Sex:  FEMALE     :  86   Associated Diagnoses:   None   Author:   RAVEN BACA      Postoperative Information      Postoperative Information   Postoperative Information:          Preoperative Diagnosis:     painful perianal/ perineal lesion.         Postoperative Diagnosis: thrombosed external hemorrhoidal tag.         Performed by:    RAVEN BACA (Surgeon - Primary)  .         Assistant:    Stephanie Wang CST.         Procedures Performed:    EXCISION OF PERIANAL LESION; DRAINAGE OF THROMBOS HEMORRHOID, excision ext hem tag.         Findings: 3 cm thrombosed tag perianal.   .         Specimens Removed:    external hemorrhoidal tag.         Estimated Blood Loss: 0  ml.         Blood Administered: No.         Complications: None.         Grafts/Implants: None.    Anesthetic utilized:     HUGO NELSON (Supervisor)  .             Electronically Signed On 2019 07:47  __________________________________________________   RAVEN BAAC     [FreeTextEntry1] : YAMIL GHOSH is a 33 year old male to female transgender patient with a history of gender dysphoria. She seeks consultation for facial feminization surgery. She reports that she has been socially transitioning since 11 y/o . She has been medically transitioning since 12 years of age . She takes cloazepam and zoloft. Her past medical history includes anxiety and depression. Her past surgical history includes FFS surgery with Dr. Skinner 9/2022. She is currently in transgender care at [facility] transgender center. She denies tobacco, alcohol, THC and other illicit drugs. Patient denies history of previous gender affirming procedures such as Botox, silicone, fillers, liposuction and fat grafting. Patient denies personal and family medical history of clots, strokes, bleeding disorders and anesthesia problems. She reports that the male appearance of her face exacerbate her gender dysphoria and cause misgendering.

## 2023-08-21 ENCOUNTER — APPOINTMENT (OUTPATIENT)
Dept: CT IMAGING | Facility: IMAGING CENTER | Age: 33
End: 2023-08-21

## 2023-08-30 ENCOUNTER — APPOINTMENT (OUTPATIENT)
Dept: CT IMAGING | Facility: IMAGING CENTER | Age: 33
End: 2023-08-30
Payer: COMMERCIAL

## 2023-08-30 ENCOUNTER — OUTPATIENT (OUTPATIENT)
Dept: OUTPATIENT SERVICES | Facility: HOSPITAL | Age: 33
LOS: 1 days | End: 2023-08-30
Payer: COMMERCIAL

## 2023-08-30 DIAGNOSIS — Z98.82 BREAST IMPLANT STATUS: Chronic | ICD-10-CM

## 2023-08-30 DIAGNOSIS — F64.9 GENDER IDENTITY DISORDER, UNSPECIFIED: ICD-10-CM

## 2023-08-30 PROCEDURE — 70486 CT MAXILLOFACIAL W/O DYE: CPT | Mod: 26

## 2023-08-30 PROCEDURE — 70486 CT MAXILLOFACIAL W/O DYE: CPT

## 2023-09-05 ENCOUNTER — APPOINTMENT (OUTPATIENT)
Dept: PLASTIC SURGERY | Facility: CLINIC | Age: 33
End: 2023-09-05
Payer: COMMERCIAL

## 2023-09-05 PROCEDURE — 99213 OFFICE O/P EST LOW 20 MIN: CPT

## 2023-09-05 NOTE — HISTORY OF PRESENT ILLNESS
[FreeTextEntry1] : YAMIL GHOSH is a 33 year old male to female transgender patient with a history of gender dysphoria. She seeks consultation for facial feminization surgery. She reports that she has been socially transitioning since 11 y/o . She has been medically transitioning since 12 years of age . She takes cloazepam and zoloft. Her past medical history includes anxiety and depression. Her past surgical history includes FFS surgery with Dr. Skinner 9/2022. She is currently in transgender care at [facility] transgender center. She denies tobacco, alcohol, THC and other illicit drugs. Patient denies history of previous gender affirming procedures such as Botox, silicone, fillers, liposuction and fat grafting. Patient denies personal and family medical history of clots, strokes, bleeding disorders and anesthesia problems. She reports that the male appearance of her face exacerbate her gender dysphoria and cause misgendering. She reports that the appearance of her face has caused more dysphoria now than it has previous to her FFS surgery. She reports that she does not like the appearance and the whole face needs to be revised to help her gender dysphoria

## 2023-09-12 ENCOUNTER — NON-APPOINTMENT (OUTPATIENT)
Age: 33
End: 2023-09-12

## 2023-10-24 ENCOUNTER — APPOINTMENT (OUTPATIENT)
Dept: PLASTIC SURGERY | Facility: CLINIC | Age: 33
End: 2023-10-24
Payer: COMMERCIAL

## 2023-10-24 PROCEDURE — 99213 OFFICE O/P EST LOW 20 MIN: CPT

## 2023-12-14 ENCOUNTER — APPOINTMENT (OUTPATIENT)
Dept: PLASTIC SURGERY | Facility: CLINIC | Age: 33
End: 2023-12-14
Payer: COMMERCIAL

## 2023-12-14 PROCEDURE — 99213 OFFICE O/P EST LOW 20 MIN: CPT

## 2023-12-21 NOTE — ASSESSMENT
[FreeTextEntry1] : Pt was seen and examined together by KEREN Haywood and Dr. Josh Ruvalcaba. Assessment and plan formulated and discussed at time of visit.

## 2023-12-21 NOTE — HISTORY OF PRESENT ILLNESS
[FreeTextEntry1] : Abhay is a transgender female designated male at birth with a history of gender dysphoria who presents for revision facial feminization surgery.  She reports that some of the outcomes from her previous surgeries have further exacerbated her feelings of gender dysphoria.  She notes asymmetry and facial imbalances hypertrophic scarring residual masculine features and incomplete results not fully corrected by her previous surgeries.  Patient reports that there is residual visual tracheal cartilage that is prominent.  Her cheeks still appear flat and hypovolemic.  There was some improvement in her hairline however the scar is stretched out and wide in the hairline is still receding.  The scar is disfiguring Abhay underwent facial feminization surgery in September 2022 with Dr. Skinner.  She is currently a patient at [facility] transgender center.  Patient reports that the visible, hypertrophic and hyperemic scars resulted in misgendering and consistently trigger  her feelings of dysphoria.

## 2023-12-21 NOTE — REASON FOR VISIT
[Follow-Up: _____] : a [unfilled] follow-up visit [FreeTextEntry1] :  for medically necessary facial feminization to alleviate symptoms of gender dysphoria

## 2024-01-18 ENCOUNTER — APPOINTMENT (OUTPATIENT)
Dept: PLASTIC SURGERY | Facility: CLINIC | Age: 34
End: 2024-01-18
Payer: COMMERCIAL

## 2024-01-18 PROCEDURE — 99213 OFFICE O/P EST LOW 20 MIN: CPT

## 2024-01-20 NOTE — REASON FOR VISIT
[Follow-Up: _____] : a [unfilled] follow-up visit [FreeTextEntry1] : For revision ffs, patient had insurance and code questions.

## 2024-04-16 ENCOUNTER — APPOINTMENT (OUTPATIENT)
Dept: PLASTIC SURGERY | Facility: CLINIC | Age: 34
End: 2024-04-16
Payer: COMMERCIAL

## 2024-04-16 DIAGNOSIS — F64.9 GENDER IDENTITY DISORDER, UNSPECIFIED: ICD-10-CM

## 2024-04-16 PROCEDURE — 99212 OFFICE O/P EST SF 10 MIN: CPT

## 2024-04-16 NOTE — HISTORY OF PRESENT ILLNESS
[FreeTextEntry1] : For revision ffs, patient had insurance and code questions. Patient previously received approval for revision rhinoplasty, genioplasty platysmaplasty and liposuction Additional codes that were previously denied for canthoplasty, upper lip lip reduction plus fat grafting to lips, canthoplasty, brow lift, forehead reduction were resubmitted after recommended time.  Approval still pending

## 2024-05-21 ENCOUNTER — APPOINTMENT (OUTPATIENT)
Dept: PLASTIC SURGERY | Facility: CLINIC | Age: 34
End: 2024-05-21

## 2024-06-10 ENCOUNTER — APPOINTMENT (OUTPATIENT)
Dept: PLASTIC SURGERY | Facility: CLINIC | Age: 34
End: 2024-06-10
Payer: COMMERCIAL

## 2024-06-10 PROCEDURE — 99213 OFFICE O/P EST LOW 20 MIN: CPT

## 2024-06-13 RX ORDER — IBUPROFEN 600 MG/1
600 TABLET, FILM COATED ORAL EVERY 6 HOURS
Qty: 28 | Refills: 0 | Status: COMPLETED | COMMUNITY
Start: 2022-09-02 | End: 2024-06-13

## 2024-06-13 RX ORDER — IBUPROFEN 600 MG/1
600 TABLET, FILM COATED ORAL EVERY 6 HOURS
Qty: 28 | Refills: 0 | Status: COMPLETED | COMMUNITY
Start: 2022-09-13 | End: 2024-06-13

## 2024-06-13 RX ORDER — GABAPENTIN 300 MG/1
300 CAPSULE ORAL
Qty: 10 | Refills: 0 | Status: COMPLETED | COMMUNITY
Start: 2022-09-13 | End: 2024-06-13

## 2024-06-13 RX ORDER — OXYCODONE 5 MG/1
5 TABLET ORAL
Qty: 18 | Refills: 0 | Status: COMPLETED | COMMUNITY
Start: 2022-09-02 | End: 2024-06-13

## 2024-06-13 RX ORDER — EMTRICITABINE AND TENOFOVIR ALAFENAMIDE 120; 15 MG/1; MG/1
TABLET ORAL
Refills: 0 | Status: ACTIVE | COMMUNITY

## 2024-06-13 RX ORDER — LAMOTRIGINE 25 MG/1
TABLET ORAL
Refills: 0 | Status: ACTIVE | COMMUNITY

## 2024-06-13 RX ORDER — GABAPENTIN 300 MG/1
300 CAPSULE ORAL
Qty: 10 | Refills: 0 | Status: COMPLETED | COMMUNITY
Start: 2022-09-02 | End: 2024-06-13

## 2024-06-13 RX ORDER — SPIRONOLACTONE 50 MG/1
TABLET ORAL
Refills: 0 | Status: ACTIVE | COMMUNITY

## 2024-06-13 RX ORDER — GABAPENTIN 300 MG/1
300 CAPSULE ORAL
Qty: 6 | Refills: 0 | Status: COMPLETED | COMMUNITY
Start: 2022-09-16 | End: 2024-06-13

## 2024-06-13 RX ORDER — ESTRADIOL VALERATE 20 MG/ML
20 INJECTION INTRAMUSCULAR
Refills: 0 | Status: ACTIVE | COMMUNITY

## 2024-06-13 RX ORDER — CHLORHEXIDINE GLUCONATE, 0.12% ORAL RINSE 1.2 MG/ML
0.12 SOLUTION DENTAL
Qty: 1 | Refills: 0 | Status: COMPLETED | COMMUNITY
Start: 2022-09-02 | End: 2024-06-13

## 2024-06-13 RX ORDER — CHLORHEXIDINE GLUCONATE, 0.12% ORAL RINSE 1.2 MG/ML
0.12 SOLUTION DENTAL
Qty: 1 | Refills: 0 | Status: COMPLETED | COMMUNITY
Start: 2022-09-13 | End: 2024-06-13

## 2024-06-13 RX ORDER — OXYCODONE 5 MG/1
5 TABLET ORAL
Qty: 9 | Refills: 0 | Status: COMPLETED | COMMUNITY
Start: 2022-09-13 | End: 2024-06-13

## 2024-06-13 RX ORDER — OXYCODONE 5 MG/1
5 TABLET ORAL
Qty: 9 | Refills: 0 | Status: COMPLETED | COMMUNITY
Start: 2022-09-16 | End: 2024-06-13

## 2024-06-13 RX ORDER — CLONAZEPAM 2 MG/1
TABLET ORAL
Refills: 0 | Status: ACTIVE | COMMUNITY

## 2024-06-19 NOTE — HISTORY OF PRESENT ILLNESS
[FreeTextEntry1] : Abhay is a transgender female designated male at birth with a history of gender dysphoria who presents for revision facial feminization surgery. She reports that some of the outcomes from her previous surgeries have further exacerbated her feelings of gender dysphoria. She notes asymmetry and facial imbalances hypertrophic scarring, scar contractures and residual masculine features and incomplete results not fully corrected by her previous surgeries. Patient reports that there is residual visual tracheal cartilage that is prominent. Her cheeks still appear flat and hypovolemic. There was some improvement in her hairline however the scar is stretched out and wide in the hairline is still receding. The scar is disfiguring and very notable if she does not wear a wig or head scarf to cover it.  She also has a scar contracture and resultant contour deformity of the underside of her chin.  She reports that the right brow sits higher than the left and her right mandible and jaw is more prominent than the left side . Abhay reports having had a mandibular angle shave in [place] in 2019 and an initial rhinoplasty.Abhay underwent facial feminization surgery in September 2022 with Dr. Skinner.  The procedure was performed in September 2022 were extensive including frontal sinus setback bilateral orbital reconstruction, brow lift, hairline lowering, fat grafting to her face, and osseous genioplasty, mandibular reconstruction with both prosthetic and autologous tissue and mandibular angle reduction, fascial graft to the upper lip, revision rhinoplasty, septoplasty, platysmaplasty neck tightening and tracheal shave.  Patient reports initial improvement in her feelings of dysphoria however she reports focusing on the asymmetries and features that were not addressed.  she is currently a patient at [facility] transgender center. Patient reports that the visible, hypertrophic and hyperemic scars resulted in misgendering and consistently trigger her feelings of dysphoria.  Patient seeks to correct the hypertrophic scar of her forehead, the asymmetry of her brows with a unilateral brow lift, the asymmetry of her jaw with a unilateral reduction, and upper lip lift and additional fat grafting to her lips.  Patient also reports that her eyes are rounder and more masculine in appearance. takes Descovy for PREP

## 2024-07-02 ENCOUNTER — APPOINTMENT (OUTPATIENT)
Dept: CT IMAGING | Facility: IMAGING CENTER | Age: 34
End: 2024-07-02

## 2024-08-11 ENCOUNTER — TRANSCRIPTION ENCOUNTER (OUTPATIENT)
Age: 34
End: 2024-08-11

## 2024-08-12 ENCOUNTER — APPOINTMENT (OUTPATIENT)
Dept: PLASTIC SURGERY | Facility: HOSPITAL | Age: 34
End: 2024-08-12

## 2024-08-12 ENCOUNTER — TRANSCRIPTION ENCOUNTER (OUTPATIENT)
Age: 34
End: 2024-08-12

## 2024-08-12 PROCEDURE — 15773 GRFG AUTOL FAT LIPO 25 CC/<: CPT

## 2024-08-12 PROCEDURE — 15825 RHYTDCT NCK PLTYSML TGHTG: CPT | Mod: 50

## 2024-08-12 PROCEDURE — 15824 RHYTIDECTOMY FOREHEAD: CPT

## 2024-08-12 PROCEDURE — 67900 REPAIR BROW DEFECT: CPT | Mod: 50

## 2024-08-12 PROCEDURE — 30420 RECONSTRUCTION OF NOSE: CPT

## 2024-08-12 PROCEDURE — 21122 GENIOP SLDG OSTEOT 2/>: CPT

## 2024-08-12 PROCEDURE — 31899 UNLISTED PX TRACHEA BRONCHI: CPT

## 2024-08-13 ENCOUNTER — TRANSCRIPTION ENCOUNTER (OUTPATIENT)
Age: 34
End: 2024-08-13

## 2024-08-13 RX ORDER — CHLORHEXIDINE GLUCONATE, 0.12% ORAL RINSE 1.2 MG/ML
0.12 SOLUTION DENTAL
Qty: 150 | Refills: 4 | Status: ACTIVE | COMMUNITY
Start: 2024-08-13 | End: 1900-01-01

## 2024-08-13 RX ORDER — AMOXICILLIN AND CLAVULANATE POTASSIUM 500; 125 MG/1; MG/1
500-125 TABLET, FILM COATED ORAL TWICE DAILY
Qty: 10 | Refills: 0 | Status: ACTIVE | COMMUNITY
Start: 2024-08-13 | End: 1900-01-01

## 2024-08-13 RX ORDER — METHYLPREDNISOLONE 4 MG/1
4 TABLET ORAL
Qty: 1 | Refills: 0 | Status: ACTIVE | COMMUNITY
Start: 2024-08-13 | End: 1900-01-01

## 2024-08-13 RX ORDER — OXYCODONE AND ACETAMINOPHEN 5; 325 MG/1; MG/1
5-325 TABLET ORAL
Qty: 15 | Refills: 0 | Status: ACTIVE | COMMUNITY
Start: 2024-08-13 | End: 1900-01-01

## 2024-08-16 RX ORDER — OXYCODONE AND ACETAMINOPHEN 5; 325 MG/1; MG/1
5-325 TABLET ORAL
Qty: 10 | Refills: 0 | Status: ACTIVE | COMMUNITY
Start: 2024-08-16 | End: 1900-01-01

## 2024-08-20 ENCOUNTER — APPOINTMENT (OUTPATIENT)
Dept: PLASTIC SURGERY | Facility: CLINIC | Age: 34
End: 2024-08-20
Payer: COMMERCIAL

## 2024-08-20 DIAGNOSIS — F64.9 GENDER IDENTITY DISORDER, UNSPECIFIED: ICD-10-CM

## 2024-08-20 PROCEDURE — 99024 POSTOP FOLLOW-UP VISIT: CPT

## 2024-08-20 NOTE — HISTORY OF PRESENT ILLNESS
[FreeTextEntry1] : DOS 08/12/24 - PROCEDURES:  Revision of facial feminization with:  Fat grafting to the zygoma and lips as well as, 23476  Revision osseous genioplasty and mandible body resection. 21122  Revision open septorhinoplasty. 96455, 37821  Revision tracheal shave. 65080  Revision of neck scar contracture and platysmaplasty. 96135  Revision of the forehead and upper brow lift as well as scar revision of the forehead. 96147, 18078-93  No excessive bleeding. No fevers. No odor. No purulent discharge. No excessive pain.

## 2024-08-30 NOTE — HISTORY OF PRESENT ILLNESS
[FreeTextEntry1] : DOS 08/12/24 - PROCEDURES: Revision of facial feminization with: Fat grafting to the zygoma and lips as well as, 39283 Revision osseous genioplasty and mandible body resection. 21122 Revision open septorhinoplasty. 72308, 02525 Revision tracheal shave. 24414 Revision of neck scar contracture and platysmaplasty. 99303 Revision of the forehead and upper brow lift as well as scar revision of the forehead. 75937, 50895-49

## 2024-09-03 ENCOUNTER — APPOINTMENT (OUTPATIENT)
Dept: PLASTIC SURGERY | Facility: CLINIC | Age: 34
End: 2024-09-03

## 2024-09-06 ENCOUNTER — NON-APPOINTMENT (OUTPATIENT)
Age: 34
End: 2024-09-06

## 2024-09-10 ENCOUNTER — APPOINTMENT (OUTPATIENT)
Dept: PLASTIC SURGERY | Facility: CLINIC | Age: 34
End: 2024-09-10
Payer: COMMERCIAL

## 2024-09-10 DIAGNOSIS — F64.9 GENDER IDENTITY DISORDER, UNSPECIFIED: ICD-10-CM

## 2024-09-10 PROCEDURE — 11900 INJECT SKIN LESIONS </W 7: CPT

## 2024-09-10 NOTE — HISTORY OF PRESENT ILLNESS
[FreeTextEntry1] : DOS 08/12/24 - PROCEDURES: Revision of facial feminization with: Fat grafting to the zygoma and lips as well as, 82348 Revision osseous genioplasty and mandible body resection. 21122 Revision open septorhinoplasty. 82363, 54303 Revision tracheal shave. 76227 Revision of neck scar contracture and platysmaplasty. 23434 Revision of the forehead and upper brow lift as well as scar revision of the forehead. 54354, 89214-61

## 2024-09-27 PROBLEM — Z78.9 NON-SMOKER: Status: ACTIVE | Noted: 2024-09-27

## 2024-09-27 PROBLEM — F41.9 ANXIETY AND DEPRESSION: Status: RESOLVED | Noted: 2024-09-27 | Resolved: 2024-09-27

## 2024-09-27 NOTE — HISTORY OF PRESENT ILLNESS
[FreeTextEntry1] : 35yo transgender female assigned male at birth (Adrienne, she/her) presenting for discussion on breast augmentation revision. She initially underwent the procedure 3 years ago at [facility]. She has size [] cc silicone implants. She notes that she has developed pain and soreness, and breast MRI has revealed capsular contracture. She states she currently wears a size C bra and wishes to be around the same but slightly larger. She has been on feminizing hormones for over 10 years. She has not noticed any additional breast growth in the last several months. She denies any lumps, bumps, or other recent changes in her breasts. She denies a family history of melanoma, breast, ovarian, or pancreatic cancer. She denies any family or personal history of blood clots. She states that nipple sensation is significantly important to her (5/5 importance). She has no plans to breast or chest feed.  Patient is currently unemployed looking for work. She is on SSI benefits. Lives with supportive mother who is aware of her surgical plans. Denies tobacco, alcohol, and any other recreational drug use. Patient denies any history of psychiatric hospitalization or ER admission.

## 2024-09-27 NOTE — ASSESSMENT
[FreeTextEntry1] : The patient is specifically interested in [] style implants. Her preferred bra sizer is [].  If she is interested in proceeding, we will need 2 letters of assessment and another appointment for review of the patient decision checklist.  I, Dr. Palmer, personally performed the evaluation and management (E/M) services for this new patient. That E/M includes conducting the clinically appropriate initial history &/or exam, assessing all conditions, and establishing the plan of care. Today, my JASIEL, Raheem Valdez PA-C, was here to observe my evaluation and management service for this patient & follow plan of care established by me going forward.

## 2024-10-01 ENCOUNTER — APPOINTMENT (OUTPATIENT)
Dept: PLASTIC SURGERY | Facility: CLINIC | Age: 34
End: 2024-10-01
Payer: COMMERCIAL

## 2024-10-01 DIAGNOSIS — F64.9 GENDER IDENTITY DISORDER, UNSPECIFIED: ICD-10-CM

## 2024-10-01 PROCEDURE — 99024 POSTOP FOLLOW-UP VISIT: CPT

## 2024-10-01 NOTE — HISTORY OF PRESENT ILLNESS
[FreeTextEntry1] : DOS 08/12/24 - PROCEDURES: Revision of facial feminization with: Fat grafting to the zygoma and lips as well as, 39313 Revision osseous genioplasty and mandible body resection. 21122 Revision open septorhinoplasty. 88351, 69897 Revision tracheal shave. 47718 Revision of neck scar contracture and platysmaplasty. 00323 Revision of the forehead and upper brow lift as well as scar revision of the forehead. 84148, 99431-89

## 2024-10-02 ENCOUNTER — APPOINTMENT (OUTPATIENT)
Dept: PLASTIC SURGERY | Facility: CLINIC | Age: 34
End: 2024-10-02

## 2024-10-02 DIAGNOSIS — F32.A ANXIETY DISORDER, UNSPECIFIED: ICD-10-CM

## 2024-10-02 DIAGNOSIS — Z78.9 OTHER SPECIFIED HEALTH STATUS: ICD-10-CM

## 2024-10-02 DIAGNOSIS — F41.9 ANXIETY DISORDER, UNSPECIFIED: ICD-10-CM

## 2024-10-23 ENCOUNTER — APPOINTMENT (OUTPATIENT)
Dept: PLASTIC SURGERY | Facility: CLINIC | Age: 34
End: 2024-10-23

## 2024-10-29 ENCOUNTER — APPOINTMENT (OUTPATIENT)
Dept: PLASTIC SURGERY | Facility: CLINIC | Age: 34
End: 2024-10-29

## 2024-10-29 DIAGNOSIS — F64.9 GENDER IDENTITY DISORDER, UNSPECIFIED: ICD-10-CM

## 2024-10-29 PROCEDURE — 11900 INJECT SKIN LESIONS </W 7: CPT | Mod: 58

## 2024-11-13 ENCOUNTER — APPOINTMENT (OUTPATIENT)
Dept: PLASTIC SURGERY | Facility: CLINIC | Age: 34
End: 2024-11-13

## 2024-12-03 ENCOUNTER — APPOINTMENT (OUTPATIENT)
Dept: PLASTIC SURGERY | Facility: CLINIC | Age: 34
End: 2024-12-03
Payer: COMMERCIAL

## 2024-12-03 DIAGNOSIS — F64.9 GENDER IDENTITY DISORDER, UNSPECIFIED: ICD-10-CM

## 2024-12-03 DIAGNOSIS — L91.0 HYPERTROPHIC SCAR: ICD-10-CM

## 2024-12-03 PROCEDURE — 99213 OFFICE O/P EST LOW 20 MIN: CPT

## 2025-01-21 ENCOUNTER — APPOINTMENT (OUTPATIENT)
Dept: PLASTIC SURGERY | Facility: CLINIC | Age: 35
End: 2025-01-21
Payer: COMMERCIAL

## 2025-01-21 DIAGNOSIS — F64.9 GENDER IDENTITY DISORDER, UNSPECIFIED: ICD-10-CM

## 2025-01-21 DIAGNOSIS — L91.0 HYPERTROPHIC SCAR: ICD-10-CM

## 2025-01-21 PROCEDURE — 99213 OFFICE O/P EST LOW 20 MIN: CPT

## 2025-03-04 ENCOUNTER — APPOINTMENT (OUTPATIENT)
Dept: PLASTIC SURGERY | Facility: CLINIC | Age: 35
End: 2025-03-04

## 2025-05-13 ENCOUNTER — OUTPATIENT (OUTPATIENT)
Dept: OUTPATIENT SERVICES | Facility: HOSPITAL | Age: 35
LOS: 1 days | End: 2025-05-13

## 2025-05-13 VITALS
HEART RATE: 87 BPM | TEMPERATURE: 98 F | WEIGHT: 194.01 LBS | DIASTOLIC BLOOD PRESSURE: 72 MMHG | SYSTOLIC BLOOD PRESSURE: 105 MMHG | RESPIRATION RATE: 16 BRPM | HEIGHT: 72 IN

## 2025-05-13 DIAGNOSIS — L90.5 SCAR CONDITIONS AND FIBROSIS OF SKIN: ICD-10-CM

## 2025-05-13 DIAGNOSIS — F64.9 GENDER IDENTITY DISORDER, UNSPECIFIED: ICD-10-CM

## 2025-05-13 DIAGNOSIS — Z98.82 BREAST IMPLANT STATUS: Chronic | ICD-10-CM

## 2025-05-13 DIAGNOSIS — Z98.890 OTHER SPECIFIED POSTPROCEDURAL STATES: Chronic | ICD-10-CM

## 2025-05-13 NOTE — H&P PST ADULT - NSANTHBMIRD_ENT_A_CORE
Chris Tan had an injection last week and is calling to give an update. Please call [phone]  
Patient reports 25% relief from last injection. Injection scheduled.    
No

## 2025-05-13 NOTE — H&P PST ADULT - NSANTHOSAYNRD_GEN_A_CORE
No. MALACHI screening performed.  STOP BANG Legend: 0-2 = LOW Risk; 3-4 = INTERMEDIATE Risk; 5-8 = HIGH Risk

## 2025-05-13 NOTE — H&P PST ADULT - PROBLEM SELECTOR PLAN 1
Patient tentatively scheduled for upper lip lift fat grafting and scar revision of neck for 5/21/25. Pre-op instructions provided. Pt given verbal and written instructions with teach back on pepcid. Pt verbalized understanding with return demonstration.

## 2025-05-13 NOTE — H&P PST ADULT - NSICDXPASTMEDICALHX_GEN_ALL_CORE_FT
PAST MEDICAL HISTORY:  Gender identity disorder, unspecified     Scar conditions and fibrosis of skin

## 2025-05-13 NOTE — H&P PST ADULT - HISTORY OF PRESENT ILLNESS
36 y/o transgender female designated male at birth with a history of gender dysphoria presents to presurgical testing with diagnosis of gender identity disorder and scar conditions and fibrosis of skin. Pt underwent facial feminization surgery in 2022 with revision in 2024. Pt is scheduled for upper lip lift fat grafting and scar revision of neck.

## 2025-05-21 ENCOUNTER — OUTPATIENT (OUTPATIENT)
Dept: OUTPATIENT SERVICES | Facility: HOSPITAL | Age: 35
LOS: 1 days | End: 2025-05-21

## 2025-05-21 ENCOUNTER — TRANSCRIPTION ENCOUNTER (OUTPATIENT)
Age: 35
End: 2025-05-21

## 2025-05-21 ENCOUNTER — APPOINTMENT (OUTPATIENT)
Dept: PLASTIC SURGERY | Facility: HOSPITAL | Age: 35
End: 2025-05-21
Payer: COMMERCIAL

## 2025-05-21 VITALS
TEMPERATURE: 98 F | OXYGEN SATURATION: 98 % | HEART RATE: 77 BPM | SYSTOLIC BLOOD PRESSURE: 117 MMHG | RESPIRATION RATE: 16 BRPM | DIASTOLIC BLOOD PRESSURE: 72 MMHG

## 2025-05-21 VITALS
HEART RATE: 83 BPM | WEIGHT: 194.01 LBS | HEIGHT: 72 IN | OXYGEN SATURATION: 98 % | RESPIRATION RATE: 16 BRPM | DIASTOLIC BLOOD PRESSURE: 65 MMHG | SYSTOLIC BLOOD PRESSURE: 113 MMHG | TEMPERATURE: 97 F

## 2025-05-21 DIAGNOSIS — L90.5 SCAR CONDITIONS AND FIBROSIS OF SKIN: ICD-10-CM

## 2025-05-21 DIAGNOSIS — Z98.890 OTHER SPECIFIED POSTPROCEDURAL STATES: Chronic | ICD-10-CM

## 2025-05-21 DIAGNOSIS — Z98.82 BREAST IMPLANT STATUS: Chronic | ICD-10-CM

## 2025-05-21 PROCEDURE — 40799 UNLISTED PROCEDURE LIPS: CPT

## 2025-05-21 PROCEDURE — 15773 GRFG AUTOL FAT LIPO 25 CC/<: CPT

## 2025-05-21 PROCEDURE — 14041 TIS TRNFR F/C/C/M/N/A/G/H/F: CPT

## 2025-05-21 RX ORDER — OXYCODONE HYDROCHLORIDE 30 MG/1
1 TABLET ORAL
Qty: 10 | Refills: 0
Start: 2025-05-21

## 2025-05-21 NOTE — ASU DISCHARGE PLAN (ADULT/PEDIATRIC) - MEDICATION INSTRUCTIONS
Take narcotic as directed.  Alternate Tylenol & Motrin/Advil/ibuprofen.  Next Tylenol @ 6:00pm Take narcotic as directed.  Alternate Tylenol & Motrin/Advil/ibuprofen.  Next Tylenol @ 6:00pm.  When taking pain/narcotic medications - take with food as it can cause nausea if taken on an empty stomach, and know it may cause constipation. To prevent constipation increase fluids and fiber in diet. You may take stool softeners (such as Colace) and follow directions as printed on bottle. - Do NOT drive while on narcotics.

## 2025-05-21 NOTE — ASU PREOPERATIVE ASSESSMENT, ADULT (IPARK ONLY) - FALL HARM RISK - UNIVERSAL INTERVENTIONS
Bed in lowest position, wheels locked, appropriate side rails in place/Call bell, personal items and telephone in reach/Instruct patient to call for assistance before getting out of bed or chair/Non-slip footwear when patient is out of bed/[place] to call system/Physically safe environment - no spills, clutter or unnecessary equipment/Purposeful Proactive Rounding/Room/bathroom lighting operational, light cord in reach

## 2025-05-21 NOTE — ASU PREOPERATIVE ASSESSMENT, ADULT (IPARK ONLY) - ESCORT HOME
Chief Complaint   Patient presents with   • Follow-up   • Office Visit   for peroneal neuritis    Tyesha Downey, 36 year old, female presenting for a right foot. States the foot is still very painful after standing or walking for long periods of time. Also notes the foot still has tingling.     Pt presents today unaccompanied  Assistive Device/Ambulatory Aids used today? None   Current pain medication regiment: nothing      Is pt currently in therapy? Completed  from  [facility], [address] , [phone]      Tobacco/Drug/Alcohol use verified. Latex allergy reviewed. Medications reviewed. Surgical and Medical History reviewed.   mom

## 2025-05-21 NOTE — ASU DISCHARGE PLAN (ADULT/PEDIATRIC) - FINANCIAL ASSISTANCE
[facility] provides services at a reduced cost to those who are determined to be eligible through [facility]’s financial assistance program. Information regarding [facility]’s financial assistance program can be found by going to [email or web address] or by calling [phone].

## 2025-05-21 NOTE — ASU DISCHARGE PLAN (ADULT/PEDIATRIC) - CALL YOUR DOCTOR IF YOU HAVE ANY OF THE FOLLOWING:
Nausea and vomiting that does not stop Bleeding that does not stop/Pain not relieved by Medications/Fever greater than (need to indicate Fahrenheit or Celsius)/Nausea and vomiting that does not stop/Inability to tolerate liquids or foods

## 2025-05-21 NOTE — ASU PREOPERATIVE ASSESSMENT, ADULT (IPARK ONLY) - TEACHING/LEARNING LEARNING PREFERENCES
----- Message from Jerri Vega MD sent at 3/7/2022  9:56 PM CST -----  Labs are stable.  
Letter sent to home address.   
verbal instruction/written material

## 2025-05-21 NOTE — PACU DISCHARGE NOTE - NAUSEA/VOMITING:
Pt needs prescription for Diabetic testing meter, strips, and lancets. Pt prefers [employer] for the brand. MD notified. Awaiting PCP's response.   None

## 2025-05-21 NOTE — ASU DISCHARGE PLAN (ADULT/PEDIATRIC) - CARE PROVIDER_API CALL
Josh Ruvalcaba  Plastic Surgery  [address], [address]  [place] [address]  Phone: [phone]  Fax: [phone]  Follow Up Time: 1 week

## 2025-05-21 NOTE — ASU DISCHARGE PLAN (ADULT/PEDIATRIC) - ASU DC SPECIAL INSTRUCTIONSFT
Please keep your dressings dry for the next 24-48 hours. Then you can shower. Please do not vigorously scrub your incisions. The sutures on your skin are all absorbable and will fall off on their own. Leave your steri strips on. Please apply aquaphor to your lips for moisturizing and apply bacitracin/neosporin to your nasal incision twice a day. You can take tylenol and ibuprofen for pain and you can take oxycodone for breakthrough pain.

## 2025-05-21 NOTE — ASU DISCHARGE PLAN (ADULT/PEDIATRIC) - DIET/FLUID RESTRICTION
No change No change/Progress slowly/Increase fluids No change/Progress slowly/Increase fluids/Other (specify diet and fluid)

## 2025-05-29 ENCOUNTER — APPOINTMENT (OUTPATIENT)
Dept: PLASTIC SURGERY | Facility: CLINIC | Age: 35
End: 2025-05-29
Payer: COMMERCIAL

## 2025-05-29 DIAGNOSIS — L91.0 HYPERTROPHIC SCAR: ICD-10-CM

## 2025-05-29 PROCEDURE — 99024 POSTOP FOLLOW-UP VISIT: CPT

## 2025-07-08 NOTE — REASON FOR VISIT
Physical Therapy    Visit Type: treatment  SUBJECTIVE  Patient in bed upon arrival. Patient agreeable to therapy.      Patient / Family Goal: return to previous functional status, maximize function and return home    Pain   Patient denies pain.     OBJECTIVE     Cognitive Status   Level of Consciousness   - alert  Arousal Alertness   - appropriate responses to stimuli  Affect/Behavior    - cooperative and pleasant  Orientation    - Oriented to: person, place, time and situation  Functional Communication   - Overall Communication Status: within functional limits   - Forms of Communication: verbal and delayed responses  Following Direction   - follows all commands and directions consistently    Vitals:  Blood Pressure (mmhg):      - Seated: 125/64 @ 827, symptomatic    Patient Activity Tolerance: 1 to 2 activity to rest         Bed Mobility  - Supine to sit: minimal assist  Min A to bring legs off bed to facilitate transfer  Transfers  Assistive devices: gait belt, 2-wheeled walker  Description: BUE use  - Sit to stand: minimal assist  - Stand to sit: minimal assist  Min A provided for force generation and controlling eccentric descent  Complained of lightheadedness/dizziness upon sitting position.     Ambulation / Gait  - Assistive device: gait belt and 2-wheeled walker  - Distance (feet unless otherwise indicated): 4  - Assist Level: minimal assist  - Surface: even  - Description: unsteady and decreased geoffrey/pace    Provided min A for steadying assist for short ambulation from edge of bed to recliner secondary to c/o dizziness while upright.    Interventions     Training provided: activity tolerance, balance retraining, bed mobility training, body mechanics, use of assistive device, safety training, transfer training, positioning, functional ambulation and gait training    Skilled input: Verbal instruction/cues  Verbal Consent: Writer verbally educated and received verbal consent for hand placement, positioning  [Follow-Up: _____] : a [unfilled] follow-up visit of patient, and techniques to be performed today from patient for therapist position for techniques as described above and how they are pertinent to the patient's plan of care.         Education:   - Present and ready to learn: patient  Education provided during session:  - role of PT, bed mobility techniques, energy conservation, fall prevention, transfer technique, gait training and proper body mechanics  - Results of above outlined education: Needs reinforcement    ASSESSMENT   Progress: progressing toward goals  Interfering components: decreased activity tolerance    Discharge needs based on today's assessment:   - Current level of function: significantly below baseline level of function   - Therapy needs at discharge: therapy 5 or more times per week   - Activities of daily living (ADLs) requiring support at discharge: bed mobility, transfers and ambulation   - Instrumental activities of daily living (IADLs) requiring support at discharge: community mobility   - Impairments that require further therapy intervention: strength, pain, activity tolerance, balance, cognition and motor planning    AM-PAC  - Generalized Prior Level of Function: IND/MOD I ([facility] 22-24)       Key: MOD A=moderate assistance, IND/MOD I=independent/modified independent  - Generalized Current Level of Function     - Current Mobility Score: 15       AM-PAC Scoring Key= >21 Modified Independent; 20-21 Supervision; 18-19 Minimal assist; 13-17 Moderate assist; 9-12 Max assist; <9 Total assist      PLAN (while hospitalized)  Suggestions for next session as indicated: Bring aide. Bed mobility. Transfer with 2ww. Short distance gait. Assess symptoms of dizziness  PT/OT Mobility Equipment for Discharge: has ww, cane  PT/OT ADL Equipment for Discharge: has toilet riser, shower chair     Agreement to plan and goals: patient agrees with goals and treatment plan        GOALS  Review Date: 7/9/2025  Long Term Goals: (to be met by time of discharge from  [facility])  Sit to supine: Patient will complete sit to supine modified independent.  Status: progressing/ongoing  Supine to sit: Patient will complete supine to sit modified independent.  Status: progressing/ongoing  Sit to stand: Patient will complete sit to stand transfer with modified independent.   Status: progressing/ongoing  Stand to sit: Patient will complete stand to sit transfer with modified independent.   Status: progressing/ongoing  Ambulation (even): Patient will ambulate on even surface for 150 feet with modified independent.   Status: progressing/ongoing  Documented in the chart in the following areas:  Assessment/Plan.       Patient at End of Session:   Location: in chair  Safety measures: alarm system in place/re-engaged and call light within reach  Handoff to: nurse        Therapy procedure time and total treatment time can be found documented on the Time Entry flowsheet

## 2025-07-15 ENCOUNTER — APPOINTMENT (OUTPATIENT)
Dept: PLASTIC SURGERY | Facility: CLINIC | Age: 35
End: 2025-07-15
Payer: COMMERCIAL

## 2025-07-15 PROCEDURE — 99024 POSTOP FOLLOW-UP VISIT: CPT

## 2025-09-09 ENCOUNTER — APPOINTMENT (OUTPATIENT)
Dept: PLASTIC SURGERY | Facility: CLINIC | Age: 35
End: 2025-09-09
Payer: COMMERCIAL

## 2025-09-09 DIAGNOSIS — L91.0 HYPERTROPHIC SCAR: ICD-10-CM

## 2025-09-09 DIAGNOSIS — F64.9 GENDER IDENTITY DISORDER, UNSPECIFIED: ICD-10-CM

## 2025-09-09 PROCEDURE — 99213 OFFICE O/P EST LOW 20 MIN: CPT

## 2025-09-17 ENCOUNTER — APPOINTMENT (OUTPATIENT)
Dept: PLASTIC SURGERY | Facility: CLINIC | Age: 35
End: 2025-09-17

## (undated) DEVICE — ELCTR BOVIE TIP BLADE VALLEYLAB 6.5"

## (undated) DEVICE — DRSG AQUAPLAST BLANK BLUSH 3 X 3"

## (undated) DEVICE — MODEL IPS TRANSFORM

## (undated) DEVICE — NDL HYPO SAFE 25G X 1.5" (ORANGE)

## (undated) DEVICE — PACK UPPER BODY

## (undated) DEVICE — SYR LUER LOK 5CC

## (undated) DEVICE — DRSG TEGADERM 2.5 X 3"

## (undated) DEVICE — GLV 7.5 PROTEXIS (WHITE)

## (undated) DEVICE — NDL SPINAL 22G X 3.5" (BLACK)

## (undated) DEVICE — SYR LUER LOK 10CC

## (undated) DEVICE — MIDAS REX LEGEND TAPERED SM BORE 2.3MM X 8CM

## (undated) DEVICE — SUCTION YANKAUER VITAL VUE

## (undated) DEVICE — DRSG STERISTRIPS 0.5 X 4"

## (undated) DEVICE — VAGINAL PACKING 2"

## (undated) DEVICE — SYR LUER LOK 50CC

## (undated) DEVICE — POSITIONER FOAM EGG CRATE ULNAR 2PCS (PINK)

## (undated) DEVICE — NDL 18G BLUNT FILL PINK

## (undated) DEVICE — BLOCK SILICON

## (undated) DEVICE — DRSG CURITY GAUZE SPONGE 4 X 4" 12-PLY

## (undated) DEVICE — SYR LUER LOK 1CC

## (undated) DEVICE — BLADE SURGICAL #15 CARBON

## (undated) DEVICE — BUR STRYKER EGG 4MM

## (undated) DEVICE — DRSG MASTISOL

## (undated) DEVICE — MARKING PEN W RULER

## (undated) DEVICE — BATTERY PACK KLS MARTIN SINGLE USE

## (undated) DEVICE — SAW BLADE OSTEOMED VRO 12MM

## (undated) DEVICE — SUT ETHILON 5-0 18" PS-2

## (undated) DEVICE — COMPRESSION CHIN-NECK SMALL

## (undated) DEVICE — SUT SILK 2-0 30" PSL

## (undated) DEVICE — DRSG TUBE SPANDAGE 8 10YD

## (undated) DEVICE — BLADE SURGICAL #10 CARBON

## (undated) DEVICE — BIPOLAR FORCEP SYMMETRY BAYONET STR 8.25" X 1.5MM (BLUE)

## (undated) DEVICE — DRSG GAUZE MOISTURIZER 0.5 OZ 4X8

## (undated) DEVICE — SUT VICRYL 3-0 18" PS-2 UNDYED

## (undated) DEVICE — SUT PLAIN GUT 4-0 18" PS-2

## (undated) DEVICE — PACK MINOR NO DRAPE

## (undated) DEVICE — SUT VICRYL 2-0 18" CP-2 UNDYED (POP-OFF)

## (undated) DEVICE — DRAPE LAPAROTOMY TRANSVERSE

## (undated) DEVICE — SUT MONOCRYL 3-0 18" PS-2 UNDYED

## (undated) DEVICE — TAPE SILK 3"

## (undated) DEVICE — TWIST DRILL 1.5MM DIA X 50MM W/NOTCH SGL USE ONLY

## (undated) DEVICE — DRSG XEROFORM 1 X 8"

## (undated) DEVICE — ELCTR GROUNDING PAD ADULT COVIDIEN

## (undated) DEVICE — FOLEY TRAY 16FR 5CC LF UMETER CLOSED

## (undated) DEVICE — DRILL BIT KLS MARTIN TWIST STOP 1.6X40MM

## (undated) DEVICE — LAP PAD 4 X 18"

## (undated) DEVICE — SUT CHROMIC 3-0 27" PS-2

## (undated) DEVICE — Device

## (undated) DEVICE — SAW BLADE STRYKER RECIPROCATING 27MMX0.38MM

## (undated) DEVICE — ELCTR ROCKER SWITCH PENCIL BLUE 10FT

## (undated) DEVICE — SUT PLAIN GUT FAST ABSORBING 5-0 PC-1

## (undated) DEVICE — DRAPE TOWEL BLUE 17" X 24"

## (undated) DEVICE — SUT PLAIN GUT 3-0 27" PS-2

## (undated) DEVICE — RASP STRYKER LARGE TEAR CROSSCUT 14X7MM DISP

## (undated) DEVICE — DRAPE 3/4 SHEET 52X76"

## (undated) DEVICE — AESCULAP SCALPFIX 10 CLIPS

## (undated) DEVICE — CATH ANGIOCATH 12G

## (undated) DEVICE — SUCTION YANKAUER BULBOUS TIP NO VENT

## (undated) DEVICE — SOL IRR POUR NS 0.9% 500ML

## (undated) DEVICE — SOL INJ LR 1000ML

## (undated) DEVICE — ELCTR COLORADO 3CM

## (undated) DEVICE — DRAPE MAGNETIC INSTRUMENT MEDIUM

## (undated) DEVICE — DRSG TELFA 3 X 8

## (undated) DEVICE — STAPLER SKIN VISI-STAT 35 WIDE

## (undated) DEVICE — DRAIN BLAKE 10FR ROUND

## (undated) DEVICE — DRSG KERLIX ROLL LG 4.5"

## (undated) DEVICE — APPLICATOR Q TIP 6" WOOD STEM

## (undated) DEVICE — VENODYNE/SCD SLEEVE CALF MEDIUM

## (undated) DEVICE — DRSG KERLIX ROLL 4.5"

## (undated) DEVICE — DRAIN RESERVOIR FOR JACKSON PRATT 100CC CARDINAL

## (undated) DEVICE — WARMING BLANKET LOWER ADULT

## (undated) DEVICE — BLADE SURGICAL #11 CARBON